# Patient Record
Sex: FEMALE | Race: BLACK OR AFRICAN AMERICAN | Employment: FULL TIME | URBAN - METROPOLITAN AREA
[De-identification: names, ages, dates, MRNs, and addresses within clinical notes are randomized per-mention and may not be internally consistent; named-entity substitution may affect disease eponyms.]

---

## 2018-03-16 ENCOUNTER — HOSPITAL ENCOUNTER (OUTPATIENT)
Dept: MAMMOGRAPHY | Age: 38
Discharge: HOME | End: 2018-03-16
Attending: FAMILY MEDICINE
Payer: COMMERCIAL

## 2018-03-16 ENCOUNTER — HOSPITAL ENCOUNTER (OUTPATIENT)
Dept: ULTRASOUND IMAGING | Age: 38
Discharge: HOME | End: 2018-03-16
Attending: FAMILY MEDICINE
Payer: COMMERCIAL

## 2018-03-16 DIAGNOSIS — N64.4 MASTODYNIA: ICD-10-CM

## 2018-03-16 DIAGNOSIS — N64.4 BREAST PAIN: ICD-10-CM

## 2018-03-16 LAB — MAMMOGRAPHY, EXTERNAL: NORMAL

## 2018-03-16 PROCEDURE — 76642 ULTRASOUND BREAST LIMITED: CPT

## 2018-03-16 PROCEDURE — 77062 BREAST TOMOSYNTHESIS BI: CPT

## 2020-11-29 ENCOUNTER — HOSPITAL ENCOUNTER (EMERGENCY)
Age: 40
Discharge: HOME | End: 2020-11-29
Attending: FAMILY MEDICINE
Payer: COMMERCIAL

## 2020-11-29 VITALS
RESPIRATION RATE: 16 BRPM | TEMPERATURE: 98.4 F | HEART RATE: 79 BPM | SYSTOLIC BLOOD PRESSURE: 150 MMHG | BODY MASS INDEX: 23.32 KG/M2 | DIASTOLIC BLOOD PRESSURE: 97 MMHG | OXYGEN SATURATION: 100 % | HEIGHT: 65 IN | WEIGHT: 140 LBS

## 2020-11-29 DIAGNOSIS — L50.1 IDIOPATHIC URTICARIA: Primary | ICD-10-CM

## 2020-11-29 PROCEDURE — 74011250636 HC RX REV CODE- 250/636: Performed by: FAMILY MEDICINE

## 2020-11-29 PROCEDURE — 96375 TX/PRO/DX INJ NEW DRUG ADDON: CPT

## 2020-11-29 PROCEDURE — 99284 EMERGENCY DEPT VISIT MOD MDM: CPT

## 2020-11-29 PROCEDURE — 96374 THER/PROPH/DIAG INJ IV PUSH: CPT

## 2020-11-29 RX ORDER — FAMOTIDINE 10 MG/ML
20 INJECTION INTRAVENOUS EVERY 12 HOURS
Status: DISCONTINUED | OUTPATIENT
Start: 2020-11-29 | End: 2020-11-29 | Stop reason: HOSPADM

## 2020-11-29 RX ORDER — DIPHENHYDRAMINE HYDROCHLORIDE 50 MG/ML
50 INJECTION, SOLUTION INTRAMUSCULAR; INTRAVENOUS
Status: COMPLETED | OUTPATIENT
Start: 2020-11-29 | End: 2020-11-29

## 2020-11-29 RX ORDER — METHYLPREDNISOLONE 4 MG/1
TABLET ORAL
Qty: 1 DOSE PACK | Refills: 0 | Status: SHIPPED | OUTPATIENT
Start: 2020-11-29 | End: 2021-04-14

## 2020-11-29 RX ADMIN — DIPHENHYDRAMINE HYDROCHLORIDE 50 MG: 50 INJECTION, SOLUTION INTRAMUSCULAR; INTRAVENOUS at 14:12

## 2020-11-29 RX ADMIN — METHYLPREDNISOLONE SODIUM SUCCINATE 125 MG: 125 INJECTION, POWDER, FOR SOLUTION INTRAMUSCULAR; INTRAVENOUS at 14:08

## 2020-11-29 RX ADMIN — FAMOTIDINE 20 MG: 10 INJECTION, SOLUTION INTRAVENOUS at 14:10

## 2020-11-29 NOTE — DISCHARGE INSTRUCTIONS
Patient Education        Hives: Care Instructions  Your Care Instructions  Hives are raised, red, itchy patches of skin. They are also called wheals or welts. They usually have red borders and pale centers. Hives range in size from ¼ inch to 3 inches or more across. They may seem to move from place to place on the skin. Several hives may form a large area of raised, red skin. You can get hives after an insect sting, after taking medicine or eating certain foods, or because of infection or stress. Other causes include plants, things you breathe in, makeup, heat, cold, sunlight, and latex. You cannot spread hives to other people. Hives may last a few minutes or a few days, but a single spot may last less than 36 hours. Follow-up care is a key part of your treatment and safety. Be sure to make and go to all appointments, and call your doctor if you are having problems. It's also a good idea to know your test results and keep a list of the medicines you take. How can you care for yourself at home? · Avoid whatever you think may have caused your hives, such as a certain food or medicine. However, you may not know the cause. · Put a cool, wet towel on the area to relieve itching. · Take an over-the-counter antihistamine, such as diphenhydramine (Benadryl), cetirizine (Zyrtec), or loratadine (Claritin), to help stop the hives and calm the itching. Read and follow directions on the label. These medicines can make you feel sleepy. Do not drive while using them. · Stay away from strong soaps, detergents, and chemicals. These can make itching worse. When should you call for help? Call 911 anytime you think you may need emergency care. For example, call if:    · You have symptoms of a severe allergic reaction. These may include:  ? Sudden raised, red areas (hives) all over your body. ? Swelling of the throat, mouth, lips, or tongue. ? Trouble breathing. ? Passing out (losing consciousness).  Or you may feel very lightheaded or suddenly feel weak, confused, or restless. Call your doctor now or seek immediate medical care if:    · You have symptoms of an allergic reaction, such as:  ? A rash or hives (raised, red areas on the skin). ? Itching. ? Swelling. ? Belly pain, nausea, or vomiting.     · You get hives after you start a new medicine.     · Hives have not gone away after 24 hours. Watch closely for changes in your health, and be sure to contact your doctor if:    · You do not get better as expected. Where can you learn more? Go to http://www.gray.com/  Enter [id number] in the search box to learn more about \"Hives: Care Instructions. \"  Current as of: June 26, 2019               Content Version: 12.6  © 6899-7310 [employer]. Care instructions adapted under license by [employer] (which disclaims liability or warranty for this information). If you have questions about a medical condition or this instruction, always ask your healthcare professional. [address] any warranty or liability for your use of this information.

## 2020-11-29 NOTE — ED PROVIDER NOTES
EMERGENCY DEPARTMENT HISTORY AND PHYSICAL EXAM      Date: 11/29/2020  Patient Name: Jossy Paz    History of Presenting Illness     Chief Complaint   Patient presents with    Allergic Reaction       History Provided By: Patient    HPI: Jossy Paz, 47 y.o. female presents to the ED with complaints of a possible allergic reaction. Pt reports sxs beginning with a rash on the right hand 2 days ago. She attempted to treat the rash with Benadryl, with minimal relief. She woke up this morning with swelling of her lower lip and under bilateral eyes, prompting her visit to the ED. Pt did not take any Benadryl today. She denies SOB, difficulty swallowing, wheezing, fever, chills, or N/V. NKDA. Pt denies any recent use of new soaps, lotions, laundry detergents or foods. She does not take any medications for blood pressure. There are no other complaints, changes, or physical findings at this time. PCP: Trey Erazo MD    No current facility-administered medications on file prior to encounter. Current Outpatient Medications on File Prior to Encounter   Medication Sig Dispense Refill    [DISCONTINUED] PRENATAL VIT/FE FUMARATE/FA (PRENATAL PO) Take  by mouth. Past History     Past Medical History:  History reviewed. No pertinent past medical history. Past Surgical History:  History reviewed. No pertinent surgical history. Family History:  Family History   Problem Relation Age of Onset   [address] Hypertension Mother     Hypertension Father        Social History:  Social History     Tobacco Use    Smoking status: Never Smoker    Smokeless tobacco: Never Used   Substance Use Topics    Alcohol use: No    Drug use: No       Allergies:  No Known Allergies      Review of Systems   Review of Systems   Constitutional: Negative for chills and fever. HENT: Positive for facial swelling (lower lip). Negative for trouble swallowing. Eyes:        Positive for swelling under bilateral eyes. Respiratory: Negative for shortness of breath and wheezing. Gastrointestinal: Negative for nausea and vomiting. Skin: Positive for rash (right hand). Physical Exam   Physical Exam  Vitals signs and nursing note reviewed. Constitutional:       General: She is awake. She is not in acute distress. Appearance: She is well-developed. She is not diaphoretic. HENT:      Head: Normocephalic and atraumatic. Right Ear: External ear normal.      Left Ear: External ear normal.      Mouth/Throat:      Mouth: Mucous membranes are moist.      Pharynx: Oropharynx is clear. Comments: There is swelling of the lower lip. No tongue swelling. Eyes:      General: No scleral icterus. Conjunctiva/sclera: Conjunctivae normal.      Pupils: Pupils are equal, round, and reactive to light. Comments: There is puffiness under bilateral eyes. Neck:      Musculoskeletal: Normal range of motion and neck supple. Thyroid: No thyromegaly. Vascular: No JVD. Trachea: No tracheal deviation. Cardiovascular:      Rate and Rhythm: Normal rate and regular rhythm. Heart sounds: Normal heart sounds. No murmur. No friction rub. No gallop. Pulmonary:      Effort: Pulmonary effort is normal. No respiratory distress. Breath sounds: Normal breath sounds. No stridor. No wheezing, rhonchi or rales. Musculoskeletal: Normal range of motion. General: No tenderness. Lymphadenopathy:      Cervical: No cervical adenopathy. Skin:     General: Skin is warm and dry. Findings: No erythema or rash. Neurological:      Mental Status: She is alert and oriented to person, place, and time. Cranial Nerves: No cranial nerve deficit. Psychiatric:         Behavior: Behavior normal.         Thought Content:  Thought content normal.         Judgment: Judgment normal.         Diagnostic Study Results     Labs -   No results found for this or any previous visit (from the past 12 hour(s)). Radiologic Studies -   No orders to display     CT Results  (Last 48 hours)    None        CXR Results  (Last 48 hours)    None            Medical Decision Making   I am the first provider for this patient. I reviewed the vital signs, available nursing notes, past medical history, past surgical history, family history and social history. Vital Signs-Reviewed the patient's vital signs. Patient Vitals for the past 12 hrs:   Temp Pulse Resp BP SpO2   11/29/20 1416  80 16 (!) 146/97 100 %   11/29/20 1332 98.4 °F (36.9 °C) 89 16 (!) 166/105 100 %       Records Reviewed: Nursing Notes    ED Course:   1338  Initial assessment performed. The patients presenting problems have been discussed, and they are in agreement with the care plan formulated and outlined with them. I have encouraged them to ask questions as they arise throughout their visit. Provider Notes (Medical Decision Making):     2763  Presents with recurrent drug reaction. She does not know what she inhaled or ingested that may have caused this. To have a minor viral illness earlier in the week last week. She was given some steroids and Benadryl and Pepcid today. Her symptoms have slightly improved. Her home with a prescription for a Medrol Dosepak. If they do not resolve she may need to see an allergist.        Consultations:     Consultations: - NONE    Disposition     Disposition: DC- Adult Discharges: All of the diagnostic tests were reviewed and questions answered. Diagnosis, care plan and treatment options were discussed. The patient understands the instructions and will follow up as directed. The patients results have been reviewed with them. They have been counseled regarding their diagnosis. The patient verbally convey understanding and agreement of the signs, symptoms, diagnosis, treatment and prognosis and additionally agrees to follow up as recommended with their PCP in 24 - 48 hours.   They also agree with the care-plan and convey that all of their questions have been answered. I have also put together some discharge instructions for them that include: 1) educational information regarding their diagnosis, 2) how to care for their diagnosis at home, as well a 3) list of reasons why they would want to return to the ED prior to their follow-up appointment, should their condition change. Kathryn Melendez's  results have been reviewed with her. She has been counseled regarding her diagnosis, treatment, and plan. She verbally conveys understanding and agreement of the signs, symptoms, diagnosis, treatment and prognosis and additionally agrees to follow up as discussed. She also agrees with the care-plan and conveys that all of her questions have been answered. I have also provided discharge instructions for her that include: educational information regarding their diagnosis and treatment, and list of reasons why they would want to return to the ED prior to their follow-up appointment, should her condition change. Labs Reviewed - No data to display     No results found for this or any previous visit (from the past 12 hour(s)). No orders to display        CLINICAL IMPRESSION    1. Idiopathic urticaria        PLAN:  1. Current Discharge Medication List      START taking these medications    Details   methylPREDNISolone (Medrol, Kamlesh,) 4 mg tablet Take as directed. Qty: 1 Dose Pack, Refills: 0           2. Follow-up Information    None       Return to ED if worse     Diagnosis     Clinical Impression:   1. Idiopathic urticaria        By signing my name below, I, Ismael Mccarty, ED scribe, attest that this documentation has been prepared under the direction and in presence of Damir Castro MD on 11/29/20.  Electronically signed: Ismael Mccarty, 11/29/20, 1:54 PM

## 2020-11-29 NOTE — ED TRIAGE NOTES
Pt states she started out with itching a few bumps 2 days ago, states she woke up this am with swollen eyes and her lips started to swell. Pt has been taking benadryl ( although she has not had any today), but the swelling and itching is getting worse. Pt denies any new products/foods or meds.    No hx of taking Lisinopril

## 2020-11-29 NOTE — Clinical Note
[facility] EMERGENCY DEPT 
[facility] [id number] 
[phone] Work/School Note Date: 11/29/2020 To Whom It May concern: 
 
Milagros Augustine was seen and treated today in the emergency room by the following provider(s): 
Attending Provider: Jhony Miller MD. Milagros Augustine is excused from work/school on 11/29/20 and 11/30/20. She is medically clear to return to work/school on 12/1/2020. Sincerely, Rhianna Ansari MD

## 2020-12-04 ENCOUNTER — OFFICE VISIT (OUTPATIENT)
Dept: FAMILY MEDICINE CLINIC | Age: 40
End: 2020-12-04
Payer: COMMERCIAL

## 2020-12-04 VITALS
HEART RATE: 87 BPM | BODY MASS INDEX: 26.82 KG/M2 | SYSTOLIC BLOOD PRESSURE: 150 MMHG | OXYGEN SATURATION: 100 % | HEIGHT: 65 IN | WEIGHT: 161 LBS | DIASTOLIC BLOOD PRESSURE: 109 MMHG | TEMPERATURE: 97.8 F

## 2020-12-04 DIAGNOSIS — I10 ESSENTIAL HYPERTENSION: Primary | ICD-10-CM

## 2020-12-04 DIAGNOSIS — L50.9 URTICARIA: ICD-10-CM

## 2020-12-04 PROCEDURE — 99213 OFFICE O/P EST LOW 20 MIN: CPT | Performed by: FAMILY MEDICINE

## 2020-12-04 RX ORDER — LOSARTAN POTASSIUM 50 MG/1
50 TABLET ORAL DAILY
Qty: 30 TAB | Refills: 1 | Status: SHIPPED | OUTPATIENT
Start: 2020-12-04 | End: 2021-04-14

## 2020-12-04 NOTE — PROGRESS NOTES
Ewa Hagen presents today for No chief complaint on file. Is someone accompanying this pt? no    Is the patient using any DME equipment during [address]? no    Depression Screening:  3 most recent PHQ Screens 12/4/2020   Little interest or pleasure in doing things Not at all   Feeling down, depressed, irritable, or hopeless Not at all   Total Score PHQ 2 0       Learning Assessment:  Learning Assessment 12/4/2020   PRIMARY LEARNER Patient   PRIMARY LANGUAGE ENGLISH   LEARNER PREFERENCE PRIMARY DEMONSTRATION   ANSWERED BY patient   RELATIONSHIP SELF       Health Maintenance reviewed and discussed and ordered per Provider. Health Maintenance Due   Topic Date Due    DTaP/Tdap/Td series (1 - Tdap) 01/28/2003    PAP AKA CERVICAL CYTOLOGY  01/28/2003    Flu Vaccine (1) 09/01/2020   . Coordination of Care:  1. Have you been to the ER, urgent care clinic since your last visit? Hospitalized since your last visit? no    2. Have you seen or consulted any other health care providers outside of the [address] [place] [address] since your last visit? Include any pap smears or colon screening.  no    Providers orders his own labs, orders for colonoscopy, mammograms and referrals as needed    Last UDS Checked no  Last Pain contract signed: no

## 2020-12-04 NOTE — PROGRESS NOTES
Subjective:   Ayanna Willard is a 47 y.o. female who was seen for No chief complaint on file. HPI patient is a 28-year-old -American female who is seen because she went to the emergency room with urticaria and was found to have an elevated blood pressure. She was given steroids her urticaria is improving she has no idea why she had that. She had no falls or injuries no rash no syncope or loss of consciousness. Bowel movements have been appropriate. Nobody at home has been sick she has never had urticaria before she has a significant family history of hypertension. She is not on any medication. She has used some new dryer sheets but that is all she can think of she did try their Thanksgiving turkey and peanut oil. Home Medications    Medication Sig Start Date End Date Taking? Authorizing Provider   methylPREDNISolone (Medrol, Kamlesh,) 4 mg tablet Take as directed. 11/29/20   Radha Augustine MD      No Known Allergies  Social History     Tobacco Use    Smoking status: Never Smoker    Smokeless tobacco: Never Used   Substance Use Topics    Alcohol use: No    Drug use: No        Patient Active Problem List   Diagnosis Code    Essential hypertension I10    Urticaria L50.9       Review of Systems   Constitutional: Negative. HENT: Negative. Eyes: Negative. Respiratory: Negative. Cardiovascular: Negative. Gastrointestinal: Negative. Endocrine: Negative. Genitourinary: Negative. Musculoskeletal: Negative. Allergic/Immunologic:        Urticaria   Neurological: Negative. Hematological: Negative. Psychiatric/Behavioral: Negative.          Physical Exam   Objective:     Visit Vitals  BP (!) 150/109 (BP 1 Location: Left arm, BP Patient Position: Sitting)   Pulse 87   Temp 97.8 °F (36.6 °C)   Ht 5' 5\" (1.651 m)   Wt 161 lb (73 kg)   SpO2 100%   BMI 26.79 kg/m²      General: alert, cooperative, no distress   Mental  status: normal mood, behavior, speech, dress, motor activity, and thought processes, able to follow commands   HENT: NCAT   Neck: no visualized mass   Resp: no respiratory distress   Neuro: no gross deficits   Skin: no discoloration or lesions of concern on visible areas   Psychiatric: normal affect, consistent with stated mood, no evidence of hallucinations   Lungs are clear the cardiovascular exam showed a regular rate and rhythm the abdomen is soft extremities are clear she has no edema she is pleasant and cooperative I do not hear any murmurs gallops or rubs. Her gait is normal.  No edema. Assessment & Plan:     Hypertension newly diagnosed, urticaria: The urticaria is improved significantly. I am starting her on losartan 50 mg a day I have asked her to get a blood pressure cuff I want to check her pressure twice a day and will reevaluate in 3 weeks or sooner if needed I have asked her to limit her salt to continue her physical activity and she will call us if any other issues arise we may need a further cardiac evaluation      I spent at least 23 minutes on this visit with this established patient. [phone]    Additional exam findings: We discussed the expected course, resolution and complications of the diagnosis(es) in detail. Medication risks, benefits, costs, interactions, and alternatives were discussed as indicated. I advised her to contact the office if her condition worsens, changes or fails to improve as anticipated. She expressed understanding with the diagnosis(es) and plan.

## 2020-12-22 VITALS
DIASTOLIC BLOOD PRESSURE: 88 MMHG | HEART RATE: 82 BPM | RESPIRATION RATE: 20 BRPM | WEIGHT: 143 LBS | OXYGEN SATURATION: 100 % | TEMPERATURE: 97.7 F | HEIGHT: 66 IN | BODY MASS INDEX: 22.98 KG/M2 | SYSTOLIC BLOOD PRESSURE: 132 MMHG

## 2020-12-22 RX ORDER — ETONOGESTREL 68 MG/1
IMPLANT SUBCUTANEOUS
COMMUNITY

## 2021-04-14 ENCOUNTER — OFFICE VISIT (OUTPATIENT)
Dept: FAMILY MEDICINE CLINIC | Age: 41
End: 2021-04-14
Payer: COMMERCIAL

## 2021-04-14 VITALS
TEMPERATURE: 96.9 F | DIASTOLIC BLOOD PRESSURE: 86 MMHG | OXYGEN SATURATION: 100 % | WEIGHT: 166 LBS | HEART RATE: 92 BPM | BODY MASS INDEX: 27.62 KG/M2 | SYSTOLIC BLOOD PRESSURE: 136 MMHG

## 2021-04-14 DIAGNOSIS — I10 ESSENTIAL HYPERTENSION: Primary | ICD-10-CM

## 2021-04-14 DIAGNOSIS — F43.20 ADJUSTMENT DISORDER, UNSPECIFIED TYPE: ICD-10-CM

## 2021-04-14 PROCEDURE — 99213 OFFICE O/P EST LOW 20 MIN: CPT | Performed by: FAMILY MEDICINE

## 2021-04-14 RX ORDER — LOSARTAN POTASSIUM 100 MG/1
100 TABLET ORAL DAILY
Qty: 30 TAB | Refills: 1 | Status: SHIPPED | OUTPATIENT
Start: 2021-04-14 | End: 2022-03-28 | Stop reason: SDUPTHER

## 2021-04-14 NOTE — PROGRESS NOTES
Subjective:   Brianna Chery is a 46 y.o. female who was seen for Follow-up (BP) and Hypertension    HPI this is the second note I dictated on this patient I do not know where in the computer the other note is gone. She is seen for follow-up of her blood pressure she and her boyfriend are ending their relationship of 11 years she is on Cozaar 50 mg she has had no fever or chills she has had no chest pain or shortness of breath has been eating relatively well nobody at home has been sick no falls or injuries. She denies being suicidal or homicidal.  No other rashes. Home Medications    Medication Sig Start Date End Date Taking? Authorizing Provider   losartan (COZAAR) 100 mg tablet Take 1 Tab by mouth daily. 4/14/21  Yes Jeremiah Noriega MD   etonogestreL (Nexplanon) 68 mg impl by SubDERmal route. Yes Provider, Historical      No Known Allergies  Social History     Tobacco Use    Smoking status: Never Smoker    Smokeless tobacco: Never Used   Substance Use Topics    Alcohol use: No    Drug use: No            Review of Systems   Constitutional: Negative. HENT: Negative. Eyes: Negative. Respiratory: Negative. Cardiovascular: Negative. Gastrointestinal: Negative. Endocrine: Negative. Genitourinary: Negative. Musculoskeletal: Negative. Hematological: Negative. Psychiatric/Behavioral: Negative.          Physical Exam   Objective:     Visit Vitals  /86   Pulse 92   Temp 96.9 °F (36.1 °C)   Wt 166 lb (75.3 kg)   SpO2 100%   BMI 27.62 kg/m²      General: alert, cooperative, no distress   Mental  status: normal mood, behavior, speech, dress, motor activity, and thought processes, able to follow commands   HENT: NCAT   Neck: no visualized mass   Resp: no respiratory distress   Neuro: no gross deficits   Skin: no discoloration or lesions of concern on visible areas   Psychiatric: normal affect, consistent with stated mood, no evidence of hallucinations     He is a little anxious today she is not clinically depressed we will going to send her medicine increase in today. I have allowed her to ventilate today. Assessment & Plan:     Tension, adjustment disorder: I have allowed her to ventilate regarding her relationship. I am going to increase her Cozaar to 100 mg. We will get her back in 3 weeks I have asked her to bring her blood pressure cuff with us we will follow-up if any other issues arise. Was a 25-minute visit greater than half the time was spent in consultation        35 20 43    Additional exam findings: We discussed the expected course, resolution and complications of the diagnosis(es) in detail. Medication risks, benefits, costs, interactions, and alternatives were discussed as indicated. I advised her to contact the office if her condition worsens, changes or fails to improve as anticipated. She expressed understanding with the diagnosis(es) and plan.

## 2021-04-14 NOTE — PROGRESS NOTES
Subjective:   Keira Gasca is a 46 y.o. female who was seen for Follow-up (BP) and Hypertension    HPI the patient is a 82-year-old female who was started on Cozaar 50 mg several weeks ago she and her boyfriend of 11 years are ending the relationship she has been under a lot of stress sometimes at home her blood, reads 150/ 5/98 she is concerned about her blood pressure but does admit she is under a lot of stress in this relationship which is going to end. She is not suicidal or homicidal.  She has no rash no syncope or loss of consciousness. Bowel movements have been appropriate. No chest pain or shortness of breath has been eating really well well. Home Medications    Medication Sig Start Date End Date Taking? Authorizing Provider   losartan (COZAAR) 100 mg tablet Take 1 Tab by mouth daily. 4/14/21  Yes Atiya Tellez MD   etonogestreL (Nexplanon) 68 mg impl by SubDERmal route. Yes Provider, Historical      No Known Allergies  Social History     Tobacco Use    Smoking status: Never Smoker    Smokeless tobacco: Never Used   Substance Use Topics    Alcohol use: No    Drug use: No            Review of Systems   Constitutional: Negative. HENT: Negative. Eyes: Negative. Respiratory: Negative. Cardiovascular: Negative. Gastrointestinal: Negative. Genitourinary: Negative. Musculoskeletal: Negative. Neurological: Negative. Hematological: Negative. Psychiatric/Behavioral: Negative.          Physical Exam   Objective:     Visit Vitals  /86   Pulse 92   Temp 96.9 °F (36.1 °C)   Wt 166 lb (75.3 kg)   SpO2 100%   BMI 27.62 kg/m²      General: alert, cooperative, no distress   Mental  status: normal mood, behavior, speech, dress, motor activity, and thought processes, able to follow commands   HENT: NCAT   Neck: no visualized mass   Resp: no respiratory distress   Neuro: no gross deficits   Skin: no discoloration or lesions of concern on visible areas Psychiatric: normal affect, consistent with stated mood, no evidence of hallucinations   Blood pressure is normal today. The lungs are clear the cardiovascular exam showed a regular rate and rhythm. The abdomen is benign the extremities are clear no thyromegaly no lymphadenopathy no murmurs gallops or rubs. Oriented x3. Not clinically anxious or depressed. No rashes are seen. Assessment & Plan:     Tension, adjustment disorder: I have allowed the patient to evaluate her relationship with his boyfriend. It looks like it can and she does not seem any medications are needed she does not need any counseling at this point. She has friends around her I am going to increase her Cozaar to 100 mg a day I have asked her to recheck it with us in 3 weeks and to bring her blood pressure cuff with us we can reevaluate it at that time. I have increased her medicine today I have allowed her to ventilate. It was a positive day for the patient        712    Additional exam findings: We discussed the expected course, resolution and complications of the diagnosis(es) in detail. Medication risks, benefits, costs, interactions, and alternatives were discussed as indicated. I advised her to contact the office if her condition worsens, changes or fails to improve as anticipated. She expressed understanding with the diagnosis(es) and plan.    Eddie Landis presents today for   Chief Complaint   Patient presents with    Follow-up     BP    Hypertension       Is someone accompanying this pt? no    Is the patient using any DME equipment during OV? no    Depression Screening:  3 most recent PHQ Screens 4/14/2021   Little interest or pleasure in doing things Not at all   Feeling down, depressed, irritable, or hopeless Not at all   Total Score PHQ 2 0       Learning Assessment:  Learning Assessment 12/4/2020   PRIMARY LEARNER Patient   PRIMARY LANGUAGE ENGLISH   LEARNER PREFERENCE PRIMARY DEMONSTRATION   ANSWERED BY patient   RELATIONSHIP SELF       Fall Risk  No flowsheet data found. ADL  ADL Assessment 4/14/2021   Feeding yourself No Help Needed   Getting from bed to chair No Help Needed   Getting dressed No Help Needed   Bathing or showering No Help Needed   Walk across the room (includes cane/walker) No Help Needed   Using the telphone No Help Needed   Taking your medications No Help Needed   Preparing meals No Help Needed   Managing money (expenses/bills) No Help Needed   Moderately strenuous housework (laundry) No Help Needed   Shopping for personal items (toiletries/medicines) No Help Needed   Shopping for groceries No Help Needed   Driving No Help Needed   Climbing a flight of stairs No Help Needed   Getting to places beyond walking distances No Help Needed       Travel Screening:    Travel Screening     Question   Response    In the last month, have you been in contact with someone who was confirmed or suspected to have Coronavirus / COVID-19? No / Unsure    Have you had a COVID-19 viral test in the last 14 days? No    Do you have any of the following new or worsening symptoms? None of these    Have you traveled internationally or domestically in the last month? No      Travel History   Travel since 03/14/21     No documented travel since 03/14/21          Health Maintenance reviewed and discussed and ordered per Provider. Health Maintenance Due   Topic Date Due    Hepatitis C Screening  Never done    DTaP/Tdap/Td series (1 - Tdap) Never done    PAP AKA CERVICAL CYTOLOGY  Never done   . Coordination of Care:  1. Have you been to the ER, urgent care clinic since your last visit? Hospitalized since your last visit? no    2. Have you seen or consulted any other health care providers outside of the [facility] [place] [address] [place] since your last visit? Include any pap smears or colon screening.  no

## 2022-03-18 PROBLEM — L50.9 URTICARIA: Status: ACTIVE | Noted: 2020-12-04

## 2022-03-20 PROBLEM — I10 ESSENTIAL HYPERTENSION: Status: ACTIVE | Noted: 2020-12-04

## 2022-03-25 DIAGNOSIS — I10 ESSENTIAL HYPERTENSION: ICD-10-CM

## 2022-03-25 NOTE — TELEPHONE ENCOUNTER
Patient has not been seen since 4/21 by Dr. Anabella Johnson. Patient must setup an appt prior to any refills.  joão Eaton

## 2022-03-25 NOTE — TELEPHONE ENCOUNTER
----- Message from Erin Newman sent at 3/25/2022  9:40 AM EDT -----  Subject: Refill Request    QUESTIONS  Name of Medication? losartan (COZAAR) 100 mg tablet  Patient-reported dosage and instructions? 100mg/2x day  How many days do you have left? 3  Preferred Pharmacy? [address] phone number (if available)? [phone]  ---------------------------------------------------------------------------  --------------  CALL BACK INFO  What is the best way for the office to contact you? OK to leave message on   voicemail  Preferred Call Back Phone Number?  [phone]

## 2022-03-28 NOTE — TELEPHONE ENCOUNTER
Patient called stated she is out of these meds, [employer]. Rx Losartan    Requested Prescriptions     Pending Prescriptions Disp Refills    losartan (COZAAR) 100 mg tablet 30 Tablet 1     Sig: Take 1 Tablet by mouth daily.  etonogestreL (Nexplanon) 68 mg impl       Sig: by SubDERmal route.

## 2022-03-29 RX ORDER — ETONOGESTREL 68 MG/1
IMPLANT SUBCUTANEOUS
OUTPATIENT
Start: 2022-03-29

## 2022-03-29 RX ORDER — LOSARTAN POTASSIUM 100 MG/1
100 TABLET ORAL DAILY
Qty: 30 TABLET | Refills: 1 | Status: SHIPPED | OUTPATIENT
Start: 2022-03-29 | End: 2022-03-30 | Stop reason: SDUPTHER

## 2022-03-30 ENCOUNTER — OFFICE VISIT (OUTPATIENT)
Dept: FAMILY MEDICINE CLINIC | Age: 42
End: 2022-03-30
Payer: COMMERCIAL

## 2022-03-30 ENCOUNTER — HOSPITAL ENCOUNTER (OUTPATIENT)
Dept: LAB | Age: 42
Discharge: HOME | End: 2022-03-30
Payer: COMMERCIAL

## 2022-03-30 VITALS
WEIGHT: 175.6 LBS | HEART RATE: 87 BPM | DIASTOLIC BLOOD PRESSURE: 103 MMHG | SYSTOLIC BLOOD PRESSURE: 154 MMHG | HEIGHT: 66 IN | RESPIRATION RATE: 16 BRPM | BODY MASS INDEX: 28.22 KG/M2 | OXYGEN SATURATION: 100 % | TEMPERATURE: 98.8 F

## 2022-03-30 DIAGNOSIS — Z13.1 DIABETES MELLITUS SCREENING: ICD-10-CM

## 2022-03-30 DIAGNOSIS — Z11.59 NEED FOR HEPATITIS C SCREENING TEST: ICD-10-CM

## 2022-03-30 DIAGNOSIS — I10 ESSENTIAL HYPERTENSION: ICD-10-CM

## 2022-03-30 DIAGNOSIS — R68.89 OTHER GENERAL SYMPTOMS AND SIGNS: ICD-10-CM

## 2022-03-30 DIAGNOSIS — Z13.220 SCREENING CHOLESTEROL LEVEL: ICD-10-CM

## 2022-03-30 DIAGNOSIS — R76.8 POSITIVE ANA (ANTINUCLEAR ANTIBODY): Primary | ICD-10-CM

## 2022-03-30 DIAGNOSIS — E55.9 VITAMIN D DEFICIENCY: ICD-10-CM

## 2022-03-30 DIAGNOSIS — R76.8 POSITIVE ANA (ANTINUCLEAR ANTIBODY): ICD-10-CM

## 2022-03-30 LAB
ALBUMIN SERPL-MCNC: 4.1 G/DL (ref 3.5–4.7)
ALBUMIN/GLOB SERPL: 1.2 {RATIO}
ALP SERPL-CCNC: 69 U/L (ref 38–126)
ALT SERPL-CCNC: 18 U/L (ref 3–52)
ANION GAP SERPL CALC-SCNC: 7 MMOL/L
AST SERPL W P-5'-P-CCNC: 20 U/L (ref 14–74)
BASOPHILS # BLD: 0 K/UL (ref 0–0.1)
BASOPHILS NFR BLD: 1 % (ref 0–2)
BILIRUB SERPL-MCNC: 0.2 MG/DL (ref 0.2–1)
BUN SERPL-MCNC: 10 MG/DL (ref 9–21)
BUN/CREAT SERPL: 17
CA-I BLD-MCNC: 9.3 MG/DL (ref 8.5–10.5)
CHLORIDE SERPL-SCNC: 104 MMOL/L (ref 94–111)
CO2 SERPL-SCNC: 25 MMOL/L (ref 21–33)
CREAT SERPL-MCNC: 0.6 MG/DL (ref 0.7–1.2)
DIFFERENTIAL METHOD BLD: ABNORMAL
EOSINOPHIL # BLD: 0.1 K/UL (ref 0–0.4)
EOSINOPHIL NFR BLD: 4 % (ref 0–5)
ERYTHROCYTE [DISTWIDTH] IN BLOOD BY AUTOMATED COUNT: 12.1 % (ref 11.6–14.5)
GLOBULIN SER CALC-MCNC: 3.5 G/DL
GLUCOSE SERPL-MCNC: 93 MG/DL (ref 70–110)
HCT VFR BLD AUTO: 35.6 % (ref 35–45)
HGB BLD-MCNC: 12.2 G/DL (ref 12–16)
IMM GRANULOCYTES # BLD AUTO: 0 K/UL (ref 0–0.04)
IMM GRANULOCYTES NFR BLD AUTO: 0 % (ref 0–0.5)
LYMPHOCYTES # BLD: 1.6 K/UL (ref 0.9–3.6)
LYMPHOCYTES NFR BLD: 41 % (ref 21–52)
MCH RBC QN AUTO: 31.8 PG (ref 24–34)
MCHC RBC AUTO-ENTMCNC: 34.3 G/DL (ref 31–37)
MCV RBC AUTO: 92.7 FL (ref 78–100)
MONOCYTES # BLD: 0.4 K/UL (ref 0.05–1.2)
MONOCYTES NFR BLD: 10 % (ref 3–10)
NEUTS SEG # BLD: 1.8 K/UL (ref 1.8–8)
NEUTS SEG NFR BLD: 44 % (ref 40–73)
NRBC # BLD: 0 K/UL (ref 0–0.01)
NRBC BLD-RTO: 0 PER 100 WBC
PLATELET # BLD AUTO: 254 K/UL (ref 135–420)
PMV BLD AUTO: 10.3 FL (ref 9.2–11.8)
POTASSIUM SERPL-SCNC: 3.8 MMOL/L (ref 3.2–5.1)
PROT SERPL-MCNC: 7.6 G/DL (ref 6.1–8.4)
RBC # BLD AUTO: 3.84 M/UL (ref 4.2–5.3)
SODIUM SERPL-SCNC: 136 MMOL/L (ref 135–145)
WBC # BLD AUTO: 3.9 K/UL (ref 4.6–13.2)

## 2022-03-30 PROCEDURE — 80053 COMPREHEN METABOLIC PANEL: CPT

## 2022-03-30 PROCEDURE — 99214 OFFICE O/P EST MOD 30 MIN: CPT | Performed by: NURSE PRACTITIONER

## 2022-03-30 PROCEDURE — 86225 DNA ANTIBODY NATIVE: CPT

## 2022-03-30 PROCEDURE — 36415 COLL VENOUS BLD VENIPUNCTURE: CPT

## 2022-03-30 PROCEDURE — 85025 COMPLETE CBC W/AUTO DIFF WBC: CPT

## 2022-03-30 RX ORDER — LOSARTAN POTASSIUM 100 MG/1
100 TABLET ORAL DAILY
Qty: 30 TABLET | Refills: 1 | Status: SHIPPED | OUTPATIENT
Start: 2022-03-30 | End: 2022-05-24 | Stop reason: SDUPTHER

## 2022-03-30 NOTE — LETTER
NOTIFICATION RETURN TO WORK    3/30/2022 9:57 AM    Ms. Lincoln Tyler  [address] [facility] [address]      To Whom It May Concern:    Lincoln Tyler is currently under the care of [address] [place] [address]. She will return to work on March 31, 2022    If there are questions or concerns please have the patient contact our office.         Sincerely,      Clifford Sullivan NP

## 2022-03-30 NOTE — PROGRESS NOTES
History of Present Illness  Kathryn Weber is a 38 y.o. female who presents today for:    Chief Complaint   Patient presents with   [facility] Establish Care     establish care with provider was a [facility] patient      Past Medical History  History reviewed. No pertinent past medical history. Surgical History  History reviewed. No pertinent surgical history. Current Medications  Current Outpatient Medications   Medication Sig    losartan (COZAAR) 100 mg tablet Take 1 Tablet by mouth daily.  etonogestreL (Nexplanon) 68 mg impl by SubDERmal route. No current facility-administered medications for this visit. Allergies/Drug Reactions  No Known Allergies     Family History  Family History   Problem Relation Age of Onset    Hypertension Mother     Hypertension Father         Social History  Social History     Tobacco Use    Smoking status: Never Smoker    Smokeless tobacco: Never Used   Substance Use Topics    Alcohol use: No    Drug use: No        Health Maintenance   Topic Date Due    Hepatitis C Screening  Never done    DTaP/Tdap/Td series (1 - Tdap) Never done    COVID-19 Vaccine (3 - Booster for Moderna series) 11/20/2021    Lipid Screen  Never done    Flu Vaccine (1) 06/30/2022 (Originally 9/1/2021)    Depression Screen  04/14/2022    Cervical cancer screen  04/07/2026    Pneumococcal 0-64 years  Aged [employer] History   Administered Date(s) Administered    COVID-19, Janine Copake, Primary or Immunocompromised Series, MRNA, PF, 100mcg/0.5mL 05/25/2021, 06/20/2021       Review of Systems  Review of Systems   Constitutional: Negative. HENT: Negative. Eyes: Negative. Respiratory: Negative. Cardiovascular: Negative. Gastrointestinal: Positive for heartburn. Negative for abdominal pain, blood in stool, constipation, diarrhea, melena, nausea and vomiting. Genitourinary: Negative. Musculoskeletal: Negative. Skin: Negative. Neurological: Negative. Endo/Heme/Allergies: Positive for environmental allergies. Negative for polydipsia. Does not bruise/bleed easily. Psychiatric/Behavioral: Negative for depression, hallucinations, memory loss, substance abuse and suicidal ideas. The patient is nervous/anxious and has insomnia.          Physical Exam  Vital signs:   Vitals:    03/30/22 0916 03/30/22 1007   BP: (!) 154/109 (!) 154/103   Pulse: 87    Resp: 16    Temp: 98.8 °F (37.1 °C)    SpO2: 100%    Weight: 175 lb 9.6 oz (79.7 kg)    Height: 5' 5.5\" (1.664 m)      General: alert, oriented, not in distress  Head: scalp normal, atraumatic  Eyes: pupils are equal and reactive, full and intact EOM's  Ears: patent ear canal, intact tympanic membrane  Nose: normal turbinates, no congestion or discharge  Lips/Mouth: moist lips and buccal mucosa, non-enlarged tonsils, pink throat  Neck: supple, no JVD, no lymphadenopathy, non-palpable thyroid  Chest/Lungs: clear breath sounds, no wheezing or crackles  Heart: normal rate, regular rhythm, no murmur  Abdomen: soft, non-distended, non-tender, normal bowel sounds, no organomegaly, no masses  Extremities: no focal deformities, no edema  Skin: no active skin lesions    Laboratory/Tests:  Hospital Outpatient Visit on 03/30/2022   Component Date Value Ref Range Status    WBC 03/30/2022 3.9* 4.6 - 13.2 K/uL Final    RBC 03/30/2022 3.84* 4.20 - 5.30 M/uL Final    HGB 03/30/2022 12.2  12.0 - 16.0 g/dL Final    HCT 03/30/2022 35.6  35.0 - 45.0 % Final    MCV 03/30/2022 92.7  78.0 - 100.0 FL Final    MCH 03/30/2022 31.8  24.0 - 34.0 PG Final    MCHC 03/30/2022 34.3  31.0 - 37.0 g/dL Final    RDW 03/30/2022 12.1  11.6 - 14.5 % Final    PLATELET 49/48/4913 813  135 - 420 K/uL Final    MPV 03/30/2022 10.3  9.2 - 11.8 FL Final    NRBC 03/30/2022 0.0  0.0  WBC Final    ABSOLUTE NRBC 03/30/2022 0.00  0.00 - 0.01 K/uL Final    NEUTROPHILS 03/30/2022 44  40 - 73 % Final    LYMPHOCYTES 03/30/2022 41  21 - 52 % Final    MONOCYTES 03/30/2022 10  3 - 10 % Final    EOSINOPHILS 03/30/2022 4  0 - 5 % Final    BASOPHILS 03/30/2022 1  0 - 2 % Final    IMMATURE GRANULOCYTES 03/30/2022 0  0 - 0.5 % Final    ABS. NEUTROPHILS 03/30/2022 1.8  1.8 - 8.0 K/UL Final    ABS. LYMPHOCYTES 03/30/2022 1.6  0.9 - 3.6 K/UL Final    ABS. MONOCYTES 03/30/2022 0.4  0.05 - 1.2 K/UL Final    ABS. EOSINOPHILS 03/30/2022 0.1  0.0 - 0.4 K/UL Final    ABS. BASOPHILS 03/30/2022 0.0  0.0 - 0.1 K/UL Final    ABS. IMM. GRANS. 03/30/2022 0.0  0.00 - 0.04 K/UL Final    DF 03/30/2022 AUTOMATED    Final    Sodium 03/30/2022 136  135 - 145 mmol/L Final    Potassium 03/30/2022 3.8  3.2 - 5.1 mmol/L Final    Chloride 03/30/2022 104  94 - 111 mmol/L Final    CO2 03/30/2022 25  21 - 33 mmol/L Final    Anion gap 03/30/2022 7  mmol/L Final    Glucose 03/30/2022 93  70 - 110 mg/dL Final    BUN 03/30/2022 10  9 - 21 mg/dL Final    Creatinine 03/30/2022 0.60* 0.70 - 1.20 mg/dL Final    BUN/Creatinine ratio 03/30/2022 17    Final    GFR est AA 03/30/2022 >60  ml/min/1.73m2 Final    GFR est non-AA 03/30/2022 >60  ml/min/1.73m2 Final    Comment: Estimated GFR is calculated using the IDMS-traceable Modification of Diet in Renal Disease (MDRD) Study equation, reported for both  Americans (GFRAA) and non- Americans (GFRNA), and normalized to 1.73m2 body surface area. The physician must decide which value applies to the patient. The MDRD study equation should only be used in individuals age 25 or older. It has not been validated for the following: pregnant women, patients with serious comorbid conditions, or on certain medications, or persons with extremes of body size, muscle mass, or nutritional status.  Calcium 03/30/2022 9.3  8.5 - 10.5 mg/dL Final    Bilirubin, total 03/30/2022 0.2  0.2 - 1.0 mg/dL Final    AST (SGOT) 03/30/2022 20  14 - 74 U/L Final    ALT (SGPT) 03/30/2022 18  3 - 52 U/L Final    Alk.  phosphatase 03/30/2022 69  38 - 126 U/L Final    Protein, total 03/30/2022 7.6  6.1 - 8.4 g/dL Final    Albumin 03/30/2022 4.1  3.5 - 4.7 g/dL Final    Globulin 03/30/2022 3.5  g/dL Final    A-G Ratio 03/30/2022 1.2    Final     Patient reports she experienced allergic reaction approximately 2 years ago and allergy testing was performed. During workup positive RAGINI was observed, but patient reports she did not follow up with provider. Approximately 2 years ago patient's Sed Rate and RA Factor were normal.  Will reorder RAGINI at this visit. Patient's blood pressure was elevated during this visit she reports she has been out of her prescribed Losartan for approximately 3 days. Will refill Losartan at this visit. Physical examination performed:  Bilateral ear tympanic membrane visualized during otoscopic examination revealed no abnormalities. Cardiac auscultation revealed no arrhythmias or murmurs. Bilateral lung sounds clear to auscultation in all fields. Abdomen soft and nontender, bowel sounds normoactive in all 4 quadrants. There is no observed bilateral lower extremity edema. Assessment/Plan:    1. Essential hypertension. Continue Losartan 100 mg tablet daily for management of essential hypertension. 2.  Positive RAGINI. Ordered RAGINI at this visit. Follow-up and Dispositions    · Return in about 1 month (around 4/30/2022). I have discussed the diagnosis with the patient and the intended plan as seen in the above orders. The patient has received an after-visit summary and questions were answered concerning future plans. I have discussed medication side effects and warnings with the patient as well. I have reviewed the plan of care with the patient, accepted their input and they are in agreement with the treatment goals.        Yazan Salcedo NP  March 30, 2022

## 2022-03-30 NOTE — PROGRESS NOTES
Grzegorz Yu presents today for   Chief Complaint   Patient presents with   [facility] Establish Care     establish care with provider was a [place] patient        Is someone accompanying this pt? No     Is the patient using any DME equipment during OV? No     Depression Screening:  3 most recent PHQ Screens 3/30/2022   Little interest or pleasure in doing things Several days   Feeling down, depressed, irritable, or hopeless Several days   Total Score PHQ 2 2       Learning Assessment:  Learning Assessment 12/4/2020   PRIMARY LEARNER Patient   PRIMARY LANGUAGE ENGLISH   LEARNER PREFERENCE PRIMARY DEMONSTRATION   ANSWERED BY patient   RELATIONSHIP SELF       Fall Risk  No flowsheet data found. ADL  ADL Assessment 4/14/2021   Feeding yourself No Help Needed   Getting from bed to chair No Help Needed   Getting dressed No Help Needed   Bathing or showering No Help Needed   Walk across the room (includes cane/walker) No Help Needed   Using the telphone No Help Needed   Taking your medications No Help Needed   Preparing meals No Help Needed   Managing money (expenses/bills) No Help Needed   Moderately strenuous housework (laundry) No Help Needed   Shopping for personal items (toiletries/medicines) No Help Needed   Shopping for groceries No Help Needed   Driving No Help Needed   Climbing a flight of stairs No Help Needed   Getting to places beyond walking distances No Help Needed       Travel Screening:    Travel Screening     Question   Response    In the last 10 days, have you been in contact with someone who was confirmed or suspected to have Coronavirus/COVID-19? No / Unsure    Have you had a COVID-19 viral test in the last 10 days? No    Do you have any of the following new or worsening symptoms? None of these    Have you traveled internationally or domestically in the last month?   No      Travel History   Travel since 02/28/22    No documented travel since 02/28/22         [facility] Maintenance reviewed and discussed and ordered per Provider. Health Maintenance Due   Topic Date Due    Hepatitis C Screening  Never done    COVID-19 Vaccine (1) Never done    DTaP/Tdap/Td series (1 - Tdap) Never done    Cervical cancer screen  Never done    Flu Vaccine (1) Never done    Lipid Screen  Never done   . Coordination of Care:  1. \"Have you been to the ER, urgent care clinic since your last visit? Hospitalized since your last visit? \" No    2. \"Have you seen or consulted any other health care providers outside of the [address] [place] [address] since your last visit? \" No     3. For patients aged 39-70: Has the patient had a colonoscopy? NA - based on age     If the patient is female:    4. For patients aged 41-77: Has the patient had a mammogram within the past 2 years? Yes - no Care Gap present    5. For patients aged 21-65: Has the patient had a pap smear? Yes - Care Gap present.  Rooming MA/LPN to request most recent results

## 2022-03-31 LAB
CENTROMERE B AB SER-ACNC: <0.2 AI (ref 0–0.9)
CHROMATIN AB SERPL-ACNC: <0.2 AI (ref 0–0.9)
DSDNA AB SER-ACNC: <1 IU/ML (ref 0–9)
ENA JO1 AB SER-ACNC: <0.2 AI (ref 0–0.9)
ENA RNP AB SER-ACNC: 0.2 AI (ref 0–0.9)
ENA SCL70 AB SER-ACNC: <0.2 AI (ref 0–0.9)
ENA SM AB SER-ACNC: <0.2 AI (ref 0–0.9)
ENA SM+RNP AB SER-ACNC: <0.2 AI (ref 0–0.9)
ENA SS-A AB SER-ACNC: <0.2 AI (ref 0–0.9)
ENA SS-B AB SER-ACNC: <0.2 AI (ref 0–0.9)
RIBOSOMAL P AB SER-ACNC: <0.2 AI (ref 0–0.9)
SEE BELOW:, 164879: NORMAL

## 2022-04-19 ENCOUNTER — OFFICE VISIT (OUTPATIENT)
Dept: FAMILY MEDICINE CLINIC | Age: 42
End: 2022-04-19
Payer: COMMERCIAL

## 2022-04-19 VITALS
BODY MASS INDEX: 29.22 KG/M2 | WEIGHT: 181.8 LBS | HEIGHT: 66 IN | RESPIRATION RATE: 18 BRPM | OXYGEN SATURATION: 100 % | SYSTOLIC BLOOD PRESSURE: 136 MMHG | HEART RATE: 82 BPM | DIASTOLIC BLOOD PRESSURE: 94 MMHG | TEMPERATURE: 97.7 F

## 2022-04-19 DIAGNOSIS — I10 ESSENTIAL HYPERTENSION: Primary | ICD-10-CM

## 2022-04-19 PROCEDURE — 99213 OFFICE O/P EST LOW 20 MIN: CPT | Performed by: STUDENT IN AN ORGANIZED HEALTH CARE EDUCATION/TRAINING PROGRAM

## 2022-04-19 NOTE — PROGRESS NOTES
Matthew Reyes presents today for   Chief Complaint   Patient presents with    Follow Up Chronic Condition       Is someone accompanying this pt? noIs the patient using any DME equipment during OV? no    Depression Screening:  3 most recent PHQ Screens 4/19/2022   Little interest or pleasure in doing things Not at all   Feeling down, depressed, irritable, or hopeless Not at all   Total Score PHQ 2 0       Learning Assessment:  Learning Assessment 12/4/2020   PRIMARY LEARNER Patient   PRIMARY LANGUAGE ENGLISH   LEARNER PREFERENCE PRIMARY DEMONSTRATION   ANSWERED BY patient   RELATIONSHIP SELF         Health Maintenance reviewed and discussed and ordered per Provider. Health Maintenance Due   Topic Date Due    Hepatitis C Screening  Never done    DTaP/Tdap/Td series (1 - Tdap) Never done    COVID-19 Vaccine (3 - Booster for Moderna series) 11/20/2021    Lipid Screen  Never done   . Coordination of Care:  1. \"Have you been to the ER, urgent care clinic since your last visit? Hospitalized since your last visit? \" No    2. \"Have you seen or consulted any other health care providers outside of the [address] [place] [address] since your last visit? \" No     3. For patients aged 39-70: Has the patient had a colonoscopy? NA - based on age     If the patient is female:    4. For patients aged 41-77: Has the patient had a mammogram within the past 2 years? NA - based on age    11. For patients aged 21-65: Has the patient had a pap smear?  NA - based on age

## 2022-04-25 NOTE — PROGRESS NOTES
Subjective:   Lesia Cm is a 38 y.o. female who was seen for Follow Up Chronic Condition      Patient here for BP check. States that it is well controlled on losartan. Denies CP or SOB     Home Medications    Medication Sig Start Date End Date Taking? Authorizing Provider   losartan (COZAAR) 100 mg tablet Take 1 Tablet by mouth daily. 3/30/22  Yes Jen BURCIAGA NP   etonogestreL (Nexplanon) 68 mg impl by SubDERmal route. Yes Provider, Historical      No Known Allergies  Social History     Tobacco Use    Smoking status: Never Smoker    Smokeless tobacco: Never Used   Substance Use Topics    Alcohol use: No    Drug use: No        Review of Systems   All other systems reviewed and are negative. Objective:     Visit Vitals  BP (!) 136/94   Pulse 82   Temp 97.7 °F (36.5 °C)   Resp 18   Ht 5' 5.5\" (1.664 m)   Wt 181 lb 12.8 oz (82.5 kg)   SpO2 100%   BMI 29.79 kg/m²        General: alert, oriented, not in distress  Head: scalp normal, atraumatic  Eyes: pupils are equal and reactive, full and intact EOM's  Ears: patent ear canal, intact tympanic membrane  Nose: normal turbinates, no congestion or discharge  Lips/Mouth: moist lips and buccal mucosa, non-enlarged tonsils, pink throat  Neck: supple, no JVD, no lymphadenopathy, non-palpable thyroid  Chest/Lungs: clear breath sounds, no wheezing or crackles  Heart: normal rate, regular rhythm, no murmur  Abdomen: soft, non-distended, non-tender, normal bowel sounds, no organomegaly, no masses  Extremities: no focal deformities, no edema  Skin: no active skin lesions      Assessment & Plan:     1. Essential hypertension  Continue losartan. I have discussed the diagnosis with the patient and the intended plan as seen in the above orders. The patient has received an after-visit summary and questions were answered concerning future plans. I have discussed medication side effects and warnings with the patient as well.  I have reviewed the plan of care with the patient, accepted their input and they are in agreement with the treatment goals. Previous lab and imaging results were reviewed by me.        Lauren Stoll MD  April 24, 2022

## 2022-05-24 DIAGNOSIS — I10 ESSENTIAL HYPERTENSION: ICD-10-CM

## 2022-05-24 NOTE — TELEPHONE ENCOUNTER
----- Message from Christy Castillo sent at 5/24/2022  9:43 AM EDT -----  Subject: Refill Request    QUESTIONS  Name of Medication? losartan (COZAAR) 100 mg tablet  Patient-reported dosage and instructions? once daily  How many days do you have left? 3  Preferred Pharmacy? [address] phone number (if available)? [phone]  ---------------------------------------------------------------------------  --------------  CALL BACK INFO  What is the best way for the office to contact you? OK to leave message on   voicemail  Preferred Call Back Phone Number? [phone]  ---------------------------------------------------------------------------  --------------  SCRIPT ANSWERS  Relationship to Patient?  Self

## 2022-05-24 NOTE — TELEPHONE ENCOUNTER
Last Visit: 4/19/22 with MD Mihai Li  Next Appointment: none  Previous Refill Encounter(s): 3/30/22 #30 with 1 refill    Requested Prescriptions     Pending Prescriptions Disp Refills    losartan (COZAAR) 100 mg tablet 90 Tablet 1     Sig: Take 1 Tablet by mouth daily.

## 2022-05-26 RX ORDER — LOSARTAN POTASSIUM 100 MG/1
100 TABLET ORAL DAILY
Qty: 90 TABLET | Refills: 1 | Status: SHIPPED | OUTPATIENT
Start: 2022-05-26

## 2022-08-10 PROCEDURE — 99284 EMERGENCY DEPT VISIT MOD MDM: CPT

## 2022-08-10 PROCEDURE — 96361 HYDRATE IV INFUSION ADD-ON: CPT

## 2022-08-10 PROCEDURE — 96375 TX/PRO/DX INJ NEW DRUG ADDON: CPT

## 2022-08-10 PROCEDURE — 96374 THER/PROPH/DIAG INJ IV PUSH: CPT

## 2022-08-11 ENCOUNTER — HOSPITAL ENCOUNTER (EMERGENCY)
Age: 42
Discharge: HOME | End: 2022-08-11
Attending: FAMILY MEDICINE
Payer: COMMERCIAL

## 2022-08-11 VITALS
SYSTOLIC BLOOD PRESSURE: 144 MMHG | BODY MASS INDEX: 28.93 KG/M2 | RESPIRATION RATE: 18 BRPM | HEART RATE: 82 BPM | WEIGHT: 180 LBS | TEMPERATURE: 97.1 F | OXYGEN SATURATION: 99 % | DIASTOLIC BLOOD PRESSURE: 85 MMHG | HEIGHT: 66 IN

## 2022-08-11 DIAGNOSIS — M62.838 MUSCLE SPASM: ICD-10-CM

## 2022-08-11 DIAGNOSIS — E87.6 HYPOKALEMIA: ICD-10-CM

## 2022-08-11 DIAGNOSIS — G44.209 TENSION HEADACHE: Primary | ICD-10-CM

## 2022-08-11 LAB
ANION GAP SERPL CALC-SCNC: 9 MMOL/L (ref 3–18)
BASOPHILS # BLD: 0 K/UL (ref 0–0.1)
BASOPHILS NFR BLD: 0 % (ref 0–2)
BUN SERPL-MCNC: 11 MG/DL (ref 7–18)
BUN/CREAT SERPL: 13 (ref 12–20)
CA-I BLD-MCNC: 9.2 MG/DL (ref 8.5–10.1)
CHLORIDE SERPL-SCNC: 105 MMOL/L (ref 100–111)
CO2 SERPL-SCNC: 25 MMOL/L (ref 21–32)
CREAT SERPL-MCNC: 0.88 MG/DL (ref 0.6–1.3)
DIFFERENTIAL METHOD BLD: ABNORMAL
EOSINOPHIL # BLD: 0.2 K/UL (ref 0–0.4)
EOSINOPHIL NFR BLD: 3 % (ref 0–5)
ERYTHROCYTE [DISTWIDTH] IN BLOOD BY AUTOMATED COUNT: 12.1 % (ref 11.6–14.5)
GLUCOSE SERPL-MCNC: 115 MG/DL (ref 74–99)
HCT VFR BLD AUTO: 35.9 % (ref 35–45)
HGB BLD-MCNC: 12.5 G/DL (ref 12–16)
IMM GRANULOCYTES # BLD AUTO: 0 K/UL (ref 0–0.04)
IMM GRANULOCYTES NFR BLD AUTO: 0 % (ref 0–0.5)
LYMPHOCYTES # BLD: 2.7 K/UL (ref 0.9–3.6)
LYMPHOCYTES NFR BLD: 36 % (ref 21–52)
MAGNESIUM SERPL-MCNC: 1.9 MG/DL (ref 1.6–2.6)
MCH RBC QN AUTO: 31 PG (ref 24–34)
MCHC RBC AUTO-ENTMCNC: 34.8 G/DL (ref 31–37)
MCV RBC AUTO: 89.1 FL (ref 78–100)
MONOCYTES # BLD: 0.5 K/UL (ref 0.05–1.2)
MONOCYTES NFR BLD: 6 % (ref 3–10)
NEUTS SEG # BLD: 4 K/UL (ref 1.8–8)
NEUTS SEG NFR BLD: 55 % (ref 40–73)
NRBC # BLD: 0 K/UL (ref 0–0.01)
NRBC BLD-RTO: 0 PER 100 WBC
PLATELET # BLD AUTO: 240 K/UL (ref 135–420)
PMV BLD AUTO: 10.8 FL (ref 9.2–11.8)
POTASSIUM SERPL-SCNC: 3.3 MMOL/L (ref 3.5–5.5)
RBC # BLD AUTO: 4.03 M/UL (ref 4.2–5.3)
SODIUM SERPL-SCNC: 139 MMOL/L (ref 136–145)
WBC # BLD AUTO: 7.3 K/UL (ref 4.6–13.2)

## 2022-08-11 PROCEDURE — 85025 COMPLETE CBC W/AUTO DIFF WBC: CPT

## 2022-08-11 PROCEDURE — 83735 ASSAY OF MAGNESIUM: CPT

## 2022-08-11 PROCEDURE — 36415 COLL VENOUS BLD VENIPUNCTURE: CPT

## 2022-08-11 PROCEDURE — 80048 BASIC METABOLIC PNL TOTAL CA: CPT

## 2022-08-11 PROCEDURE — 74011250637 HC RX REV CODE- 250/637: Performed by: FAMILY MEDICINE

## 2022-08-11 PROCEDURE — 74011250636 HC RX REV CODE- 250/636: Performed by: FAMILY MEDICINE

## 2022-08-11 RX ORDER — CYCLOBENZAPRINE HCL 10 MG
10 TABLET ORAL
Status: COMPLETED | OUTPATIENT
Start: 2022-08-11 | End: 2022-08-11

## 2022-08-11 RX ORDER — ONDANSETRON 4 MG/1
4 TABLET, ORALLY DISINTEGRATING ORAL
Qty: 20 TABLET | Refills: 0 | Status: SHIPPED | OUTPATIENT
Start: 2022-08-11

## 2022-08-11 RX ORDER — KETOROLAC TROMETHAMINE 30 MG/ML
30 INJECTION, SOLUTION INTRAMUSCULAR; INTRAVENOUS ONCE
Status: COMPLETED | OUTPATIENT
Start: 2022-08-11 | End: 2022-08-11

## 2022-08-11 RX ORDER — TIZANIDINE 2 MG/1
2 TABLET ORAL
Qty: 15 TABLET | Refills: 0 | Status: SHIPPED | OUTPATIENT
Start: 2022-08-11

## 2022-08-11 RX ORDER — POTASSIUM CHLORIDE 750 MG/1
40 TABLET, EXTENDED RELEASE ORAL
Status: COMPLETED | OUTPATIENT
Start: 2022-08-11 | End: 2022-08-11

## 2022-08-11 RX ORDER — ONDANSETRON 2 MG/ML
4 INJECTION INTRAMUSCULAR; INTRAVENOUS ONCE
Status: COMPLETED | OUTPATIENT
Start: 2022-08-11 | End: 2022-08-11

## 2022-08-11 RX ADMIN — SODIUM CHLORIDE 1000 ML: 9 INJECTION, SOLUTION INTRAVENOUS at 00:40

## 2022-08-11 RX ADMIN — CYCLOBENZAPRINE HYDROCHLORIDE 10 MG: 10 TABLET, FILM COATED ORAL at 00:28

## 2022-08-11 RX ADMIN — ONDANSETRON 4 MG: 2 INJECTION INTRAMUSCULAR; INTRAVENOUS at 00:41

## 2022-08-11 RX ADMIN — KETOROLAC TROMETHAMINE 30 MG: 30 INJECTION, SOLUTION INTRAMUSCULAR at 00:43

## 2022-08-11 RX ADMIN — POTASSIUM CHLORIDE 40 MEQ: 750 TABLET, EXTENDED RELEASE ORAL at 01:44

## 2022-08-11 NOTE — ED TRIAGE NOTES
Pt ambulatory to room with steady gait, states HA started on left side of head at aprox 1300 yesterday, took goody's then with some relief. Took another goody's at aprox 1900 with some relief, pain worse this morning with muscular bilateral neck pain starting aprox 2 hours ago. Endorses photophobia and nausea, denies vomiting.

## 2022-08-11 NOTE — ED PROVIDER NOTES
EMERGENCY DEPARTMENT HISTORY AND PHYSICAL EXAM      Date: 8/11/2022  Patient Name: Yessenia Valiente    History of Presenting Illness     Chief Complaint   Patient presents with    Headache       History Provided By: Patient    HPI: Yessenia Valiente, 38 y.o. female with a past medical history significant hypertension and headaches, +RAGINI antibody  presents to the ED with cc of headache. Endorses HA started on left side of head at aprox 1300 yesterday, took goody's then with some relief. Took another goody's at aprox 1900 with some relief, pain worse this morning with muscular bilateral neck pain starting aprox 2 hours ago. Endorses photophobia and nausea, denies vomiting. No falls, trauma, fevers. No CP, SOB, numbness, tingling, syncope. Patient has a history of headaches similar to this, this is not the worst headache of her life. No new symptoms or different symptoms with this headache. There are no other complaints, changes, or physical findings at this time. PCP: Narda Cook MD    No current facility-administered medications on file prior to encounter. Current Outpatient Medications on File Prior to Encounter   Medication Sig Dispense Refill    losartan (COZAAR) 100 mg tablet Take 1 Tablet by mouth daily. 90 Tablet 1    etonogestreL (Nexplanon) 68 mg impl by SubDERmal route. Past History     Past Medical History:  Past Medical History:   Diagnosis Date    Hypertension        Past Surgical History:  No past surgical history on file. Family History:  Family History   Problem Relation Age of Onset    Hypertension Mother     Hypertension Father        Social History:  Social History     Tobacco Use    Smoking status: Never    Smokeless tobacco: Never   Substance Use Topics    Alcohol use: Yes     Comment: socially    Drug use: No       Allergies:  No Known Allergies      Review of Systems   CONSTITUTIONAL: Denies weight loss, fever and chills.   HEENT: Denies changes in vision and hearing. RESPIRATORY: Denies SOB and cough. CV: Denies palpitations and CP. GI: Denies abdominal pain, nausea, vomiting and diarrhea. : Denies dysuria and urinary frequency. MSK: +myalgia, spasm. SKIN: Denies rash and pruritus. NEUROLOGICAL: +Headache. PSYCHIATRIC: Denies recent changes in mood. Denies anxiety and depression. Review of Systems    Physical Exam   GENERAL: Afebrile. Alert and oriented x 3. No acute distress. Well-nourished. EYES: EOMI. Anicteric. HENT: Moist mucous membranes. No scleral icterus. No cervical lymphadenopathy. LUNGS: Clear to auscultation bilaterally. No accessory muscle use. CARDIOVASCULAR: Regular rate and rhythm. No murmur. No JVD. ABDOMEN: Soft, non-tender and non-distended. No palpable masses. MSK: Bilateral cervical paraspinal muscle hypertonicity present. Trapezius and sternocleidomastoid tender points present bilaterally. EXTREMITIES: No edema. Non-tender. SKIN: No rashes or lesions. Warm. NEUROLOGIC: No focal neurological deficits. CN II-XII grossly intact, speech and gait are normal.  PSYCHIATRIC: Cooperative. Appropriate mood and affect. Physical Exam    Lab and Diagnostic Study Results     Labs -     Recent Results (from the past 12 hour(s))   CBC WITH AUTOMATED DIFF    Collection Time: 08/11/22 12:43 AM   Result Value Ref Range    WBC 7.3 4.6 - 13.2 K/uL    RBC 4.03 (L) 4.20 - 5.30 M/uL    HGB 12.5 12.0 - 16.0 g/dL    HCT 35.9 35.0 - 45.0 %    MCV 89.1 78.0 - 100.0 FL    MCH 31.0 24.0 - 34.0 PG    MCHC 34.8 31.0 - 37.0 g/dL    RDW 12.1 11.6 - 14.5 %    PLATELET 427 167 - 322 K/uL    MPV 10.8 9.2 - 11.8 FL    NRBC 0.0 0.0  WBC    ABSOLUTE NRBC 0.00 0.00 - 0.01 K/uL    NEUTROPHILS 55 40 - 73 %    LYMPHOCYTES 36 21 - 52 %    MONOCYTES 6 3 - 10 %    EOSINOPHILS 3 0 - 5 %    BASOPHILS 0 0 - 2 %    IMMATURE GRANULOCYTES 0 0 - 0.5 %    ABS. NEUTROPHILS 4.0 1.8 - 8.0 K/UL    ABS. LYMPHOCYTES 2.7 0.9 - 3.6 K/UL    ABS.  MONOCYTES 0.5 0.05 - 1.2 K/UL ABS. EOSINOPHILS 0.2 0.0 - 0.4 K/UL    ABS. BASOPHILS 0.0 0.0 - 0.1 K/UL    ABS. IMM. GRANS. 0.0 0.00 - 0.04 K/UL    DF AUTOMATED     METABOLIC PANEL, BASIC    Collection Time: 08/11/22 12:43 AM   Result Value Ref Range    Sodium 139 136 - 145 mmol/L    Potassium 3.3 (L) 3.5 - 5.5 mmol/L    Chloride 105 100 - 111 mmol/L    CO2 25 21 - 32 mmol/L    Anion gap 9 3.0 - 18.0 mmol/L    Glucose 115 (H) 74 - 99 mg/dL    BUN 11 7 - 18 mg/dL    Creatinine 0.88 0.60 - 1.30 mg/dL    BUN/Creatinine ratio 13 12 - 20      GFR est AA >60 >60 ml/min/1.73m2    GFR est non-AA >60 >60 ml/min/1.73m2    Calcium 9.2 8.5 - 10.1 mg/dL   MAGNESIUM    Collection Time: 08/11/22 12:43 AM   Result Value Ref Range    Magnesium 1.9 1.6 - 2.6 mg/dL       Radiologic Studies -   @lastxrresult@  CT Results  (Last 48 hours)      None          CXR Results  (Last 48 hours)      None              Medical Decision Making   - I am the first provider for this patient. - I reviewed the vital signs, available nursing notes, past medical history, past surgical history, family history and social history. - Initial assessment performed. The patients presenting problems have been discussed, and they are in agreement with the care plan formulated and outlined with them. I have encouraged them to ask questions as they arise throughout their visit. Vital Signs-Reviewed the patient's vital signs.   Patient Vitals for the past 12 hrs:   Temp Pulse Resp BP SpO2   08/11/22 0019 -- 84 18 (!) 143/90 100 %   08/11/22 0009 -- 82 18 (!) 157/112 99 %   08/11/22 0003 97.1 °F (36.2 °C) 84 18 (!) 174/106 100 %       Records Reviewed: Nursing Notes and Old Medical Records    The patient presents with headache with a differential diagnosis of  cluster headache, CVA, meningitis/encephalitis, migraine, sinusitis, tension headache, vascular headache, and tension headache, muscle spasm, electrolyte abnormality, dehydration      ED Course/ Provider Notes (Medical Decision Making):     ED Course as of 08/11/22 0142   Thu Aug 11, 2022   267 72-year-old female endorses HA started on left side of head at aprox 1300 yesterday, took goody's then with some relief. Took another goody's at aprox 1900 with some relief, pain worse this morning with muscular bilateral neck pain starting aprox 2 hours ago. Endorses photophobia and nausea, denies vomiting. [OM]   0132 Magnesium: 1.9 [OM]   0132 Sodium: 139 [OM]   0132 Potassium(!): 3.3  Mild hypokalemia, will replenish. [OM]   0132 All results discussed with patient. At time of discharge, she is feeling improved. No signs of infectious process, neurologic deficit. Follow up outpatient discussed. Stretches for neck demonstrated. [OM]      ED Course User Index  [OM] Glenn Crease, DO           MDM         Procedures   Medical Decision Makingedical Decision Making  Performed by: Tomy Conway DO  PROCEDURES:  Procedures       Disposition   Disposition: Condition improved  DC- Adult Discharges: All of the diagnostic tests were reviewed and questions answered. Diagnosis, care plan and treatment options were discussed. The patient understands the instructions and will follow up as directed. The patients results have been reviewed with them. They have been counseled regarding their diagnosis. The patient verbally convey understanding and agreement of the signs, symptoms, diagnosis, treatment and prognosis and additionally agrees to follow up as recommended with their PCP in 24 - 48 hours. They also agree with the care-plan and convey that all of their questions have been answered. I have also put together some discharge instructions for them that include: 1) educational information regarding their diagnosis, 2) how to care for their diagnosis at home, as well a 3) list of reasons why they would want to return to the ED prior to their follow-up appointment, should their condition change. Discharged    DISCHARGE PLAN:  1.    Current Discharge Medication List        CONTINUE these medications which have NOT CHANGED    Details   losartan (COZAAR) 100 mg tablet Take 1 Tablet by mouth daily. Qty: 90 Tablet, Refills: 1    Associated Diagnoses: Essential hypertension      etonogestreL (Nexplanon) 68 mg impl by SubDERmal route. 2.   Follow-up Information       Follow up With Specialties Details Why Contact Info    Deniz Cho MD Family Medicine Go in 1 week  [address] [phone]            3. Return to ED if worse   4. Current Discharge Medication List        START taking these medications    Details   ondansetron (ZOFRAN ODT) 4 mg disintegrating tablet Take 1 Tablet by mouth every eight (8) hours as needed for Nausea or Vomiting or PRN Reason (Other) (headache). Qty: 20 Tablet, Refills: 0  Start date: 8/11/2022      tiZANidine (ZANAFLEX) 2 mg tablet Take 1 Tablet by mouth three (3) times daily as needed for Muscle Spasm(s) or Pain. Qty: 15 Tablet, Refills: 0  Start date: 8/11/2022               Diagnosis     Clinical Impression:   1. Tension headache    2. Muscle spasm    3. Hypokalemia        Attestations:    Thegold Baltazar, DO    Please note that this dictation was completed with [employer], the computer voice recognition software. Quite often unanticipated grammatical, syntax, homophones, and other interpretive errors are inadvertently transcribed by the computer software. Please disregard these errors. Please excuse any errors that have escaped final proofreading. Thank you.

## 2022-08-11 NOTE — Clinical Note
[facility] EMERGENCY DEPT  [address] [address]  [phone]    Work/School Note    Date: 8/10/2022    To Whom It May concern:    Catia Cardenas was seen and treated today in the emergency room by the following provider(s):  Attending Provider: Candace Ortiz is excused from work/school on 8/11/2022 through 8/13/2022. She is medically clear to return to work/school on 8/14/2022.          Sincerely,          Felice Briones, DO

## 2022-08-11 NOTE — Clinical Note
[facility] EMERGENCY DEPT  [address] [id number] [phone]    Work/School Note    Date: 8/10/2022    To Whom It May concern:    Michelle Magaña was seen and treated today in the emergency room by the following provider(s):  Attending Provider: Inna Baxter is excused from work/school on 8/11/2022 through 8/13/2022. She is medically clear to return to work/school on 8/14/2022.          Sincerely,          Jeri Rudd

## 2022-08-11 NOTE — ED NOTES
Pt denies drowsiness, alert and oriented, steady gait. I have reviewed discharge instructions with the patient. The patient verbalized understanding.

## 2022-12-06 ENCOUNTER — TRANSCRIBE ORDER (OUTPATIENT)
Dept: SCHEDULING | Age: 42
End: 2022-12-06

## 2022-12-06 DIAGNOSIS — Z12.31 SCREENING MAMMOGRAM FOR BREAST CANCER: Primary | ICD-10-CM

## 2023-01-09 ENCOUNTER — OFFICE VISIT (OUTPATIENT)
Dept: FAMILY MEDICINE CLINIC | Age: 43
End: 2023-01-09
Payer: COMMERCIAL

## 2023-01-09 ENCOUNTER — HOSPITAL ENCOUNTER (OUTPATIENT)
Dept: LAB | Age: 43
Discharge: HOME | End: 2023-01-09
Payer: COMMERCIAL

## 2023-01-09 VITALS
OXYGEN SATURATION: 99 % | SYSTOLIC BLOOD PRESSURE: 150 MMHG | BODY MASS INDEX: 29.41 KG/M2 | DIASTOLIC BLOOD PRESSURE: 95 MMHG | RESPIRATION RATE: 18 BRPM | TEMPERATURE: 98 F | HEIGHT: 66 IN | WEIGHT: 183 LBS | HEART RATE: 87 BPM

## 2023-01-09 DIAGNOSIS — R51.9 FREQUENT HEADACHES: ICD-10-CM

## 2023-01-09 DIAGNOSIS — R73.01 IMPAIRED FASTING BLOOD SUGAR: ICD-10-CM

## 2023-01-09 DIAGNOSIS — I10 ESSENTIAL HYPERTENSION: Primary | ICD-10-CM

## 2023-01-09 LAB
ALBUMIN SERPL-MCNC: 3.6 G/DL (ref 3.4–5)
ALBUMIN/GLOB SERPL: 0.9 (ref 0.8–1.7)
ALP SERPL-CCNC: 92 U/L (ref 45–117)
ALT SERPL-CCNC: 23 U/L (ref 13–56)
ANION GAP SERPL CALC-SCNC: 9 MMOL/L (ref 3–18)
AST SERPL W P-5'-P-CCNC: 16 U/L (ref 10–38)
BILIRUB SERPL-MCNC: 0.3 MG/DL (ref 0.2–1)
BUN SERPL-MCNC: 13 MG/DL (ref 7–18)
BUN/CREAT SERPL: 16 (ref 12–20)
CA-I BLD-MCNC: 8.8 MG/DL (ref 8.5–10.1)
CHLORIDE SERPL-SCNC: 103 MMOL/L (ref 100–111)
CHOLEST SERPL-MCNC: 162 MG/DL
CO2 SERPL-SCNC: 27 MMOL/L (ref 21–32)
CREAT SERPL-MCNC: 0.82 MG/DL (ref 0.6–1.3)
GLOBULIN SER CALC-MCNC: 4.1 G/DL (ref 2–4)
GLUCOSE SERPL-MCNC: 93 MG/DL (ref 74–99)
HDLC SERPL-MCNC: 67 MG/DL (ref 40–60)
HDLC SERPL: 2.4 (ref 0–5)
LDLC SERPL CALC-MCNC: 78 MG/DL (ref 0–100)
LIPID PROFILE,FLP: ABNORMAL
POTASSIUM SERPL-SCNC: 4.2 MMOL/L (ref 3.5–5.5)
PROT SERPL-MCNC: 7.7 G/DL (ref 6.4–8.2)
SODIUM SERPL-SCNC: 139 MMOL/L (ref 136–145)
TRIGL SERPL-MCNC: 85 MG/DL (ref ?–150)
VLDLC SERPL CALC-MCNC: 17 MG/DL

## 2023-01-09 PROCEDURE — 80061 LIPID PANEL: CPT

## 2023-01-09 PROCEDURE — 3077F SYST BP >= 140 MM HG: CPT | Performed by: STUDENT IN AN ORGANIZED HEALTH CARE EDUCATION/TRAINING PROGRAM

## 2023-01-09 PROCEDURE — 80053 COMPREHEN METABOLIC PANEL: CPT

## 2023-01-09 PROCEDURE — 99214 OFFICE O/P EST MOD 30 MIN: CPT | Performed by: STUDENT IN AN ORGANIZED HEALTH CARE EDUCATION/TRAINING PROGRAM

## 2023-01-09 PROCEDURE — 36415 COLL VENOUS BLD VENIPUNCTURE: CPT

## 2023-01-09 PROCEDURE — 3080F DIAST BP >= 90 MM HG: CPT | Performed by: STUDENT IN AN ORGANIZED HEALTH CARE EDUCATION/TRAINING PROGRAM

## 2023-01-09 RX ORDER — IBUPROFEN 600 MG/1
600 TABLET ORAL
Qty: 30 TABLET | Refills: 1 | Status: SHIPPED | OUTPATIENT
Start: 2023-01-09

## 2023-01-09 RX ORDER — AMLODIPINE BESYLATE 5 MG/1
5 TABLET ORAL DAILY
Qty: 30 TABLET | Refills: 1 | Status: SHIPPED | OUTPATIENT
Start: 2023-01-09

## 2023-01-09 NOTE — PROGRESS NOTES
Chronic Disease Follow up    Gwynn Officer (: 1980) is a 38 y.o. female here for evaluation of the following chief concerns(s):  New Patient (Here to establish care - former [facility] patient)       ASSESSMENT/PLAN:  1. Essential hypertension  -     METABOLIC PANEL, COMPREHENSIVE  -     LIPID PANEL  -     amLODIPine (NORVASC) 5 mg tablet; Take 1 Tablet by mouth daily. , Normal, Disp-30 Tablet, R-1  2. Frequent headaches  -     ibuprofen (MOTRIN) 600 mg tablet; Take 1 Tablet by mouth every six (6) hours as needed (headaches). , Normal, Disp-30 Tablet, R-1  3. Impaired fasting blood sugar  -     HEMOGLOBIN A1C WITH EAG  Orders Placed This Encounter    METABOLIC PANEL, COMPREHENSIVE    LIPID PANEL    HEMOGLOBIN A1C WITH EAG    amLODIPine (NORVASC) 5 mg tablet     Sig: Take 1 Tablet by mouth daily. Dispense:  30 Tablet     Refill:  1    ibuprofen (MOTRIN) 600 mg tablet     Sig: Take 1 Tablet by mouth every six (6) hours as needed (headaches). Dispense:  30 Tablet     Refill:  1     HTN- above goal. Continue Losartan, add amlodipine. Recheck kidney function and cholesterol   Headaches- elevated BP may be contributing, can use Tylenol/Ibuprofen as needed. If ongoing, consider starting preventative agent. Return in about 1 month (around 2023). Kathryn Wilson agrees with plan as above and has no additional questions at this time. SUBJECTIVE/OBJECTIVE:    Patient is here for follow up blood pressure, This is a new patient to me, was previously following with Dr. Schwartz. Histories have been reviewed and updated per chart below. Patient reports BP's at home have been above goal (>140/90) for most readings. Patient denies, chest pain, SOB, leg swelling, dizziness. Does endorse frequent headaches. Compliant with current medications, tolerating well. Minimal scheduled exercise. Headaches weekly. Lasts for the whole day. Resolves with sleep. One sided. No vision changes, no  nausea/ vomiting. Achy.Sometimes ASA helps- sometimes not    Sees GYN for pap's. UTD. First mammogram is scheduled next month. 2 kids. Nexplanon for contraception. Does not smoke. Drinks socially. Past Medical History:   Diagnosis Date    Frequent headaches     Hypertension      History reviewed. No pertinent surgical history. Family History   Problem Relation Age of Onset    Hypertension Mother     Cancer Father         prostate, lung, bone    Hypertension Father        Social History     Socioeconomic History    Marital status: SINGLE   Tobacco Use    Smoking status: Never    Smokeless tobacco: Never   Substance and Sexual Activity    Alcohol use: Yes     Comment: socially    Drug use: No    Sexual activity: Yes     Partners: Male     Birth control/protection: None     Social Determinants of Health     Financial Resource Strain: Low Risk     Difficulty of Paying Living Expenses: Not hard at all   Food Insecurity: No Food Insecurity    Worried About Running Out of Food in the Last Year: Never true    Ran Out of Food in the Last Year: Never true   Transportation Needs: No Transportation Needs    Lack of Transportation (Medical): No    Lack of Transportation (Non-Medical): No   Housing Stability: Low Risk     Unable to Pay for Housing in the Last Year: No    Number of [place] in the Last Year: 1    Unstable Housing in the Last Year: No     Social History     Tobacco Use   Smoking Status Never   Smokeless Tobacco Never       Current Outpatient Medications   Medication Sig Dispense Refill    amLODIPine (NORVASC) 5 mg tablet Take 1 Tablet by mouth daily. 30 Tablet 1    ibuprofen (MOTRIN) 600 mg tablet Take 1 Tablet by mouth every six (6) hours as needed (headaches). 30 Tablet 1    tiZANidine (ZANAFLEX) 2 mg tablet Take 1 Tablet by mouth three (3) times daily as needed for Muscle Spasm(s) or Pain. 15 Tablet 0    losartan (COZAAR) 100 mg tablet Take 1 Tablet by mouth daily.  90 Tablet 1    etonogestreL (Nexplanon) 68 mg impl by SubDERmal route. No Known Allergies    BP (!) 150/95 (BP 1 Location: Left upper arm, BP Patient Position: Sitting, BP Cuff Size: Large adult)   Pulse 87   Temp 98 °F (36.7 °C) (Temporal)   Resp 18   Ht 5' 5.5\" (1.664 m)   Wt 183 lb (83 kg)   SpO2 99%   BMI 29.99 kg/m²     Gen: NAD, well appearing   Heart: RRR, no m/g/r  Lungs: CTA bilaterally, no wheezing, breathing comfortably  Ext: No swelling  Psych: cooperative. Appropriate mood and affect. Lab Results   Component Value Date/Time    WBC 7.3 08/11/2022 12:43 AM    HGB 12.5 08/11/2022 12:43 AM    HCT 35.9 08/11/2022 12:43 AM    PLATELET 283 03/34/8864 12:43 AM    MCV 89.1 08/11/2022 12:43 AM    Platelet cnt., External 125 07/23/2012 12:00 AM     Lab Results   Component Value Date/Time    Sodium 139 08/11/2022 12:43 AM    Potassium 3.3 (L) 08/11/2022 12:43 AM    Chloride 105 08/11/2022 12:43 AM    CO2 25 08/11/2022 12:43 AM    Anion gap 9 08/11/2022 12:43 AM    Glucose 115 (H) 08/11/2022 12:43 AM    BUN 11 08/11/2022 12:43 AM    Creatinine 0.88 08/11/2022 12:43 AM    BUN/Creatinine ratio 13 08/11/2022 12:43 AM    GFR est AA >60 08/11/2022 12:43 AM    GFR est non-AA >60 08/11/2022 12:43 AM    Calcium 9.2 08/11/2022 12:43 AM    Bilirubin, total 0.2 03/30/2022 10:17 AM    Alk.  phosphatase 69 03/30/2022 10:17 AM    Protein, total 7.6 03/30/2022 10:17 AM    Albumin 4.1 03/30/2022 10:17 AM    Globulin 3.5 03/30/2022 10:17 AM    A-G Ratio 1.2 03/30/2022 10:17 AM    ALT (SGPT) 18 03/30/2022 10:17 AM    AST (SGOT) 20 03/30/2022 10:17 AM     No results found for: CHOL, CHOLPOCT, CHOLX, CHLST, CHOLV, TOTCHOLEXT, HDL, HDLPOC, HDLEXT, HDLP, LDL, LDLCPOC, LDLCEXT, LDLC, DLDLP, VLDLC, VLDL, TGLX, TRIGL, TRIGLYCEXT, TRIGP, TGLPOCT, CHHD, CHHDX    On this date 01/09/23 I have spent 20 minutes reviewing previous notes, test results and face to face with the patient for interview/exam, discussing working diagnosis and treatment plan as well as documenting on the day of the visit. Medical decision making complexity: moderate    I have discussed the diagnosis with the patient and the intended plan as seen in the above orders. The patient has received an after-visit summary and questions were answered concerning future plans. I have discussed medication side effects and warnings with the patient as well. I have reviewed the plan of care with the patient, accepted their input and they are in agreement with the treatment goals. Previous lab and imaging results were reviewed by me.      Jovi Dale MD   Family Medicine

## 2023-01-09 NOTE — PROGRESS NOTES
Michelle Magaña presents today for   Chief Complaint   Patient presents with    New Patient     Here to establish care - [facility]       Is someone accompanying this pt? no    Is the patient using any DME equipment during OV? no    Depression Screening:  3 most recent PHQ Screens 1/9/2023   Little interest or pleasure in doing things More than half the days   Feeling down, depressed, irritable, or hopeless More than half the days   Total Score PHQ 2 4   Trouble falling or staying asleep, or sleeping too much More than half the days   Feeling tired or having little energy More than half the days   Poor appetite, weight loss, or overeating Not at all   Feeling bad about yourself - or that you are a failure or have let yourself or your family down Not at all   Trouble concentrating on things such as school, work, reading, or watching TV Not at all   Moving or speaking so slowly that other people could have noticed; or the opposite being so fidgety that others notice Not at all   Thoughts of being better off dead, or hurting yourself in some way Not at all   PHQ 9 Score 8   How difficult have these problems made it for you to do your work, take care of your home and get along with others Not difficult at all       Learning Assessment:  Learning Assessment 12/4/2020   PRIMARY LEARNER Patient   PRIMARY LANGUAGE ENGLISH   LEARNER PREFERENCE PRIMARY DEMONSTRATION   ANSWERED BY patient   [place] Maintenance reviewed and discussed and ordered per Provider. Health Maintenance Due   Topic Date Due    Hepatitis C Screening  Never done    DTaP/Tdap/Td series (1 - Tdap) Never done    COVID-19 Vaccine (3 - Booster for Moderna series) 08/15/2021    Lipid Screen  Never done    Flu Vaccine (1) Never done   . Coordination of Care:  1. \"Have you been to the ER, urgent care clinic since your last visit? Hospitalized since your last visit? \" No    2.  \"Have you seen or consulted any other health care providers outside of the [address] [place] [address] since your last visit? \" No     3. For patients aged 39-70: Has the patient had a colonoscopy? NA - based on age     If the patient is female:    4. For patients aged 41-77: Has the patient had a mammogram within the past 2 years? NA - based on age    11. For patients aged 21-65: Has the patient had a pap smear?  Yes - no Care Gap present

## 2023-01-31 ENCOUNTER — TRANSCRIBE ORDERS (OUTPATIENT)
Facility: HOSPITAL | Age: 43
End: 2023-01-31

## 2023-01-31 DIAGNOSIS — Z12.31 VISIT FOR SCREENING MAMMOGRAM: Primary | ICD-10-CM

## 2023-03-06 RX ORDER — LOSARTAN POTASSIUM 100 MG/1
TABLET ORAL
Qty: 90 TABLET | Refills: 1 | Status: SHIPPED | OUTPATIENT
Start: 2023-03-06

## 2023-04-07 RX ORDER — AMLODIPINE BESYLATE 5 MG/1
TABLET ORAL
Qty: 90 TABLET | Refills: 1 | Status: SHIPPED | OUTPATIENT
Start: 2023-04-07

## 2023-06-08 ENCOUNTER — HOSPITAL ENCOUNTER (OUTPATIENT)
Facility: HOSPITAL | Age: 43
Discharge: HOME | End: 2023-06-08
Payer: COMMERCIAL

## 2023-06-08 DIAGNOSIS — Z12.31 VISIT FOR SCREENING MAMMOGRAM: ICD-10-CM

## 2023-06-08 PROCEDURE — 77063 BREAST TOMOSYNTHESIS BI: CPT

## 2023-08-30 ENCOUNTER — HOSPITAL ENCOUNTER (OUTPATIENT)
Facility: HOSPITAL | Age: 43
Discharge: HOME | End: 2023-09-02
Payer: COMMERCIAL

## 2023-08-30 DIAGNOSIS — R59.0 ENLARGED LYMPH NODES IN ARMPIT: ICD-10-CM

## 2023-08-30 DIAGNOSIS — R92.2 DENSE BREAST TISSUE ON MAMMOGRAM: ICD-10-CM

## 2023-08-30 PROCEDURE — 76642 ULTRASOUND BREAST LIMITED: CPT

## 2023-09-11 RX ORDER — LOSARTAN POTASSIUM 100 MG/1
TABLET ORAL
Qty: 90 TABLET | Refills: 1 | Status: SHIPPED | OUTPATIENT
Start: 2023-09-11 | End: 2023-09-13

## 2023-09-13 ENCOUNTER — OFFICE VISIT (OUTPATIENT)
Facility: CLINIC | Age: 43
End: 2023-09-13
Payer: COMMERCIAL

## 2023-09-13 VITALS
HEIGHT: 66 IN | OXYGEN SATURATION: 99 % | SYSTOLIC BLOOD PRESSURE: 150 MMHG | DIASTOLIC BLOOD PRESSURE: 92 MMHG | HEART RATE: 85 BPM | BODY MASS INDEX: 29.73 KG/M2 | TEMPERATURE: 97.4 F | RESPIRATION RATE: 18 BRPM | WEIGHT: 185 LBS

## 2023-09-13 DIAGNOSIS — J06.9 VIRAL URI: Primary | ICD-10-CM

## 2023-09-13 DIAGNOSIS — R10.9 ABDOMINAL PAIN, UNSPECIFIED ABDOMINAL LOCATION: ICD-10-CM

## 2023-09-13 DIAGNOSIS — I10 ESSENTIAL HYPERTENSION: ICD-10-CM

## 2023-09-13 PROCEDURE — 3077F SYST BP >= 140 MM HG: CPT | Performed by: STUDENT IN AN ORGANIZED HEALTH CARE EDUCATION/TRAINING PROGRAM

## 2023-09-13 PROCEDURE — G8428 CUR MEDS NOT DOCUMENT: HCPCS | Performed by: STUDENT IN AN ORGANIZED HEALTH CARE EDUCATION/TRAINING PROGRAM

## 2023-09-13 PROCEDURE — 1036F TOBACCO NON-USER: CPT | Performed by: STUDENT IN AN ORGANIZED HEALTH CARE EDUCATION/TRAINING PROGRAM

## 2023-09-13 PROCEDURE — 99214 OFFICE O/P EST MOD 30 MIN: CPT | Performed by: STUDENT IN AN ORGANIZED HEALTH CARE EDUCATION/TRAINING PROGRAM

## 2023-09-13 PROCEDURE — 3080F DIAST BP >= 90 MM HG: CPT | Performed by: STUDENT IN AN ORGANIZED HEALTH CARE EDUCATION/TRAINING PROGRAM

## 2023-09-13 PROCEDURE — G8419 CALC BMI OUT NRM PARAM NOF/U: HCPCS | Performed by: STUDENT IN AN ORGANIZED HEALTH CARE EDUCATION/TRAINING PROGRAM

## 2023-09-13 RX ORDER — LOSARTAN POTASSIUM AND HYDROCHLOROTHIAZIDE 25; 100 MG/1; MG/1
1 TABLET ORAL DAILY
Qty: 90 TABLET | Refills: 1 | Status: SHIPPED | OUTPATIENT
Start: 2023-09-13

## 2023-09-13 NOTE — PROGRESS NOTES
Nessa Suggs presents today for   Chief Complaint   Patient presents with    Back Pain     Lower back and abdominal pain for 4 weeks       Is someone accompanying this pt? no    Is the patient using any DME equipment during OV? no    Health Maintenance reviewed and discussed and ordered per Provider. Health Maintenance Due   Topic Date Due    Hepatitis B vaccine (1 of 3 - 3-dose series) Never done    Varicella vaccine (1 of 2 - 2-dose childhood series) Never done    Hepatitis C screen  Never done    DTaP/Tdap/Td vaccine (1 - Tdap) Never done    Cervical cancer screen  Never done    COVID-19 Vaccine (3 - Moderna series) 08/15/2021    Flu vaccine (1) Never done   . Coordination of Care:  1. \"Have you been to the ER, urgent care clinic since your last visit? Hospitalized since your last visit? \" No    2. \"Have you seen or consulted any other health care providers outside of the [address] [place] [address] since your last visit? \" No

## 2023-12-26 RX ORDER — LOSARTAN POTASSIUM AND HYDROCHLOROTHIAZIDE 25; 100 MG/1; MG/1
1 TABLET ORAL DAILY
Qty: 90 TABLET | Refills: 0 | Status: SHIPPED | OUTPATIENT
Start: 2023-12-26 | End: 2023-12-27 | Stop reason: SDUPTHER

## 2023-12-27 NOTE — TELEPHONE ENCOUNTER
Patient called stating she is unable to get  losartan from [facility] asking it if can be sent to [facility].  Patient stated office has to call her insurance at [phone]

## 2023-12-28 RX ORDER — LOSARTAN POTASSIUM AND HYDROCHLOROTHIAZIDE 25; 100 MG/1; MG/1
1 TABLET ORAL DAILY
Qty: 90 TABLET | Refills: 3 | Status: SHIPPED | OUTPATIENT
Start: 2023-12-28

## 2024-12-26 RX ORDER — LOSARTAN POTASSIUM AND HYDROCHLOROTHIAZIDE 25; 100 MG/1; MG/1
1 TABLET ORAL DAILY
Qty: 90 TABLET | Refills: 3 | OUTPATIENT
Start: 2024-12-26

## 2025-01-23 NOTE — TELEPHONE ENCOUNTER
Patient calls for refill of Losartan HCTZ.  She is scheduled to see Dr. Bolaños on 02/20/2025.  She asks for 90 day supply.  I let her know that we could only send enough to last until her appointment.

## 2025-01-24 RX ORDER — LOSARTAN POTASSIUM AND HYDROCHLOROTHIAZIDE 25; 100 MG/1; MG/1
1 TABLET ORAL DAILY
Qty: 30 TABLET | Refills: 0 | Status: SHIPPED | OUTPATIENT
Start: 2025-01-24

## 2025-03-11 RX ORDER — LOSARTAN POTASSIUM AND HYDROCHLOROTHIAZIDE 25; 100 MG/1; MG/1
1 TABLET ORAL DAILY
Qty: 41 TABLET | Refills: 0 | Status: SHIPPED | OUTPATIENT
Start: 2025-03-11

## 2025-05-05 RX ORDER — LOSARTAN POTASSIUM AND HYDROCHLOROTHIAZIDE 25; 100 MG/1; MG/1
1 TABLET ORAL DAILY
Qty: 15 TABLET | Refills: 0 | Status: SHIPPED | OUTPATIENT
Start: 2025-05-05

## 2025-05-05 NOTE — TELEPHONE ENCOUNTER
Patient called requesting a refill on medication. Patient will need a short supply to last until appointment on 5/20. Would like for prescription to be sent to [facility] in [place], [place].

## 2025-05-12 RX ORDER — LOSARTAN POTASSIUM AND HYDROCHLOROTHIAZIDE 25; 100 MG/1; MG/1
1 TABLET ORAL DAILY
Qty: 15 TABLET | Refills: 0 | Status: SHIPPED | OUTPATIENT
Start: 2025-05-12

## 2025-05-12 NOTE — TELEPHONE ENCOUNTER
Patient called in requesting a refill on medication. She stated that initially her insurance was not going to cover the medication, but now they are. She stated that the pharmacy is needing another prescription sent in for the medication. Would like for prescription to be sent to [facility] in [place], [place].

## 2025-05-20 ENCOUNTER — OFFICE VISIT (OUTPATIENT)
Facility: CLINIC | Age: 45
End: 2025-05-20
Payer: COMMERCIAL

## 2025-05-20 ENCOUNTER — HOSPITAL ENCOUNTER (OUTPATIENT)
Age: 45
Setting detail: SPECIMEN
Discharge: HOME | End: 2025-05-23
Payer: COMMERCIAL

## 2025-05-20 VITALS
HEIGHT: 66 IN | RESPIRATION RATE: 18 BRPM | TEMPERATURE: 97.7 F | DIASTOLIC BLOOD PRESSURE: 97 MMHG | SYSTOLIC BLOOD PRESSURE: 144 MMHG | BODY MASS INDEX: 30.98 KG/M2 | WEIGHT: 192.8 LBS | OXYGEN SATURATION: 100 % | HEART RATE: 77 BPM

## 2025-05-20 DIAGNOSIS — I10 ESSENTIAL HYPERTENSION: ICD-10-CM

## 2025-05-20 DIAGNOSIS — N92.0 MENORRHAGIA WITH REGULAR CYCLE: ICD-10-CM

## 2025-05-20 DIAGNOSIS — Z00.00 ENCOUNTER FOR PHYSICAL EXAMINATION: ICD-10-CM

## 2025-05-20 DIAGNOSIS — E66.811 CLASS 1 OBESITY WITHOUT SERIOUS COMORBIDITY WITH BODY MASS INDEX (BMI) OF 31.0 TO 31.9 IN ADULT, UNSPECIFIED OBESITY TYPE: ICD-10-CM

## 2025-05-20 DIAGNOSIS — Z00.00 ENCOUNTER FOR PHYSICAL EXAMINATION: Primary | ICD-10-CM

## 2025-05-20 LAB
ALBUMIN SERPL-MCNC: 4 G/DL (ref 3.4–5)
ALBUMIN/GLOB SERPL: 1.1
ALP SERPL-CCNC: 90 U/L (ref 45–117)
ALT SERPL-CCNC: 21 U/L (ref 10–35)
ANION GAP SERPL CALC-SCNC: 13 MMOL/L
AST SERPL W P-5'-P-CCNC: 23 U/L (ref 10–38)
BASOPHILS # BLD: 0.02 K/UL (ref 0–0.1)
BASOPHILS NFR BLD: 0.3 % (ref 0–2)
BILIRUB SERPL-MCNC: 0.3 MG/DL (ref 0.2–1)
BUN SERPL-MCNC: 14 MG/DL (ref 6–23)
BUN/CREAT SERPL: 17
CA-I BLD-MCNC: 10 MG/DL (ref 8.5–10.1)
CHLORIDE SERPL-SCNC: 102 MMOL/L (ref 98–107)
CO2 SERPL-SCNC: 22 MMOL/L (ref 21–32)
CREAT SERPL-MCNC: 0.83 MG/DL (ref 0.6–1.3)
DIFFERENTIAL METHOD BLD: NORMAL
EOSINOPHIL # BLD: 0.07 K/UL (ref 0–0.4)
EOSINOPHIL NFR BLD: 1.2 % (ref 0–5)
ERYTHROCYTE [DISTWIDTH] IN BLOOD BY AUTOMATED COUNT: 12.4 % (ref 11.6–14.5)
GLOBULIN SER CALC-MCNC: 3.7 G/DL
GLUCOSE SERPL-MCNC: 92 MG/DL (ref 74–108)
HCT VFR BLD AUTO: 37 % (ref 35–45)
HGB BLD-MCNC: 13 G/DL (ref 12–16)
IMM GRANULOCYTES # BLD AUTO: 0.02 K/UL (ref 0–0.04)
IMM GRANULOCYTES NFR BLD AUTO: 0.3 % (ref 0–0.5)
LYMPHOCYTES # BLD: 1.88 K/UL (ref 0.9–3.6)
LYMPHOCYTES NFR BLD: 30.9 % (ref 21–52)
MCH RBC QN AUTO: 30.8 PG (ref 24–34)
MCHC RBC AUTO-ENTMCNC: 35.1 G/DL (ref 31–37)
MCV RBC AUTO: 87.7 FL (ref 78–100)
MONOCYTES # BLD: 0.36 K/UL (ref 0.05–1.2)
MONOCYTES NFR BLD: 5.9 % (ref 3–10)
NEUTS SEG # BLD: 3.73 K/UL (ref 1.8–8)
NEUTS SEG NFR BLD: 61.4 % (ref 40–73)
NRBC # BLD: 0 K/UL (ref 0–0.01)
NRBC BLD-RTO: 0 PER 100 WBC
PLATELET # BLD AUTO: 278 K/UL (ref 135–420)
PMV BLD AUTO: 10.1 FL (ref 9.2–11.8)
POTASSIUM SERPL-SCNC: 3.3 MMOL/L (ref 3.5–5.5)
PROT SERPL-MCNC: 7.7 G/DL (ref 6.4–8.2)
RBC # BLD AUTO: 4.22 M/UL (ref 4.2–5.3)
SODIUM SERPL-SCNC: 137 MMOL/L (ref 136–145)
WBC # BLD AUTO: 6.1 K/UL (ref 4.6–13.2)

## 2025-05-20 PROCEDURE — 80053 COMPREHEN METABOLIC PANEL: CPT

## 2025-05-20 PROCEDURE — 3080F DIAST BP >= 90 MM HG: CPT | Performed by: STUDENT IN AN ORGANIZED HEALTH CARE EDUCATION/TRAINING PROGRAM

## 2025-05-20 PROCEDURE — 99214 OFFICE O/P EST MOD 30 MIN: CPT | Performed by: STUDENT IN AN ORGANIZED HEALTH CARE EDUCATION/TRAINING PROGRAM

## 2025-05-20 PROCEDURE — G8427 DOCREV CUR MEDS BY ELIG CLIN: HCPCS | Performed by: STUDENT IN AN ORGANIZED HEALTH CARE EDUCATION/TRAINING PROGRAM

## 2025-05-20 PROCEDURE — 3077F SYST BP >= 140 MM HG: CPT | Performed by: STUDENT IN AN ORGANIZED HEALTH CARE EDUCATION/TRAINING PROGRAM

## 2025-05-20 PROCEDURE — 99396 PREV VISIT EST AGE 40-64: CPT | Performed by: STUDENT IN AN ORGANIZED HEALTH CARE EDUCATION/TRAINING PROGRAM

## 2025-05-20 PROCEDURE — 82728 ASSAY OF FERRITIN: CPT

## 2025-05-20 PROCEDURE — 36415 COLL VENOUS BLD VENIPUNCTURE: CPT

## 2025-05-20 PROCEDURE — 85025 COMPLETE CBC W/AUTO DIFF WBC: CPT

## 2025-05-20 PROCEDURE — 80061 LIPID PANEL: CPT

## 2025-05-20 PROCEDURE — 83540 ASSAY OF IRON: CPT

## 2025-05-20 PROCEDURE — 1036F TOBACCO NON-USER: CPT | Performed by: STUDENT IN AN ORGANIZED HEALTH CARE EDUCATION/TRAINING PROGRAM

## 2025-05-20 PROCEDURE — G8417 CALC BMI ABV UP PARAM F/U: HCPCS | Performed by: STUDENT IN AN ORGANIZED HEALTH CARE EDUCATION/TRAINING PROGRAM

## 2025-05-20 PROCEDURE — 83036 HEMOGLOBIN GLYCOSYLATED A1C: CPT

## 2025-05-20 PROCEDURE — 83550 IRON BINDING TEST: CPT

## 2025-05-20 RX ORDER — TIZANIDINE 2 MG/1
2 TABLET ORAL 3 TIMES DAILY PRN
Qty: 30 TABLET | Refills: 0 | Status: SHIPPED | OUTPATIENT
Start: 2025-05-20

## 2025-05-20 RX ORDER — AMLODIPINE BESYLATE 5 MG/1
5 TABLET ORAL DAILY
Qty: 30 TABLET | Refills: 0 | Status: SHIPPED | OUTPATIENT
Start: 2025-05-20

## 2025-05-20 RX ORDER — LOSARTAN POTASSIUM AND HYDROCHLOROTHIAZIDE 25; 100 MG/1; MG/1
1 TABLET ORAL DAILY
Qty: 90 TABLET | Refills: 3 | Status: SHIPPED | OUTPATIENT
Start: 2025-05-20

## 2025-05-20 RX ORDER — AMLODIPINE BESYLATE 5 MG/1
5 TABLET ORAL DAILY
Qty: 90 TABLET | OUTPATIENT
Start: 2025-05-20

## 2025-05-20 SDOH — ECONOMIC STABILITY: FOOD INSECURITY: WITHIN THE PAST 12 MONTHS, YOU WORRIED THAT YOUR FOOD WOULD RUN OUT BEFORE YOU GOT MONEY TO BUY MORE.: NEVER TRUE

## 2025-05-20 SDOH — ECONOMIC STABILITY: FOOD INSECURITY: WITHIN THE PAST 12 MONTHS, THE FOOD YOU BOUGHT JUST DIDN'T LAST AND YOU DIDN'T HAVE MONEY TO GET MORE.: NEVER TRUE

## 2025-05-20 ASSESSMENT — PATIENT HEALTH QUESTIONNAIRE - PHQ9
SUM OF ALL RESPONSES TO PHQ QUESTIONS 1-9: 0
1. LITTLE INTEREST OR PLEASURE IN DOING THINGS: NOT AT ALL
2. FEELING DOWN, DEPRESSED OR HOPELESS: NOT AT ALL
SUM OF ALL RESPONSES TO PHQ QUESTIONS 1-9: 0

## 2025-05-20 ASSESSMENT — ENCOUNTER SYMPTOMS
GASTROINTESTINAL NEGATIVE: 1
RESPIRATORY NEGATIVE: 1

## 2025-05-20 NOTE — PROGRESS NOTES
Well Adult Note  Name: Naomi Cano Today’s Date: 2025   MRN: [id number] Sex: Female   Age: 45 y.o. Ethnicity: Non- / Non    : 1980 Race: Black /       Naomi Cano is here for a well adult exam.       Assessment & Plan   Encounter for physical examination  -     Hemoglobin A1C; Future  -     Lipid Panel; Future  -     Comprehensive Metabolic Panel; Future  -     CBC with Auto Differential; Future  -     Iron and TIBC; Future  -     Ferritin; Future  Essential hypertension  -     Hemoglobin A1C; Future  -     Lipid Panel; Future  -     Comprehensive Metabolic Panel; Future  -     CBC with Auto Differential; Future  -     Iron and TIBC; Future  -     Ferritin; Future  Class 1 obesity without serious comorbidity with body mass index (BMI) of 31.0 to 31.9 in adult, unspecified obesity type  -     Hemoglobin A1C; Future  -     Lipid Panel; Future  -     Comprehensive Metabolic Panel; Future  -     CBC with Auto Differential; Future  -     Iron and TIBC; Future  -     Ferritin; Future  Menorrhagia with regular cycle  -     Iron and TIBC; Future  -     Ferritin; Future    HTN- uncontrolled. Continue Losartan HCTZ. Restart Amlodipine (Has been on in the past and tolerated well). Reviewed DASH diet, regular exercise. Needs updated blood work per above.     Obesity- Diet and exercise counseling. Recommend at least 150 minutes weekly of moderate intensity activity. Limit processed foods, fruit and veggie intake of at least 5 servings per day. Cutting down on sugar sweetened beverages    Menorrhagia- following with GYN, Nexplanon out in the past year. Check iron and ferritin levels      Return for 2 weeks nurse visit BP, 6 months with me.       Subjective   History:    HTN   Complications: none  Compliant with current medications, tolerating well.   Strong family history of HTN    Uses Tizanidine PRN for occasional neck pain     HM/Social  Never smoker  Drinks

## 2025-05-20 NOTE — PROGRESS NOTES
Naomi Cano presents today for   Chief Complaint   Patient presents with    Annual Exam     physical       Is someone accompanying this pt? no    Is the patient using any DME equipment during OV? no    Health Maintenance Due   Topic Date Due    Depression Screen  Never done    Varicella vaccine (1 of 2 - 13+ 2-dose series) Never done    Hepatitis C screen  Never done    Hepatitis B vaccine (1 of 3 - 19+ 3-dose series) Never done    DTaP/Tdap/Td vaccine (1 - Tdap) Never done    COVID-19 Vaccine (3 - 2024-25 season) 09/01/2024         \"Have you been to the ER, urgent care clinic since your last visit?  Hospitalized since your last visit?\"    NO    “Have you seen or consulted any other health care providers outside our system since your last visit?”    NO

## 2025-05-21 ENCOUNTER — RESULTS FOLLOW-UP (OUTPATIENT)
Facility: CLINIC | Age: 45
End: 2025-05-21

## 2025-05-21 DIAGNOSIS — E87.6 HYPOKALEMIA: Primary | ICD-10-CM

## 2025-05-21 LAB
CHOLEST SERPL-MCNC: 183 MG/DL
EST. AVERAGE GLUCOSE BLD GHB EST-MCNC: 110 MG/DL
FERRITIN SERPL-MCNC: 63 NG/ML (ref 13–400)
HBA1C MFR BLD: 5.5 % (ref 4.2–5.6)
HDLC SERPL-MCNC: 61 MG/DL (ref 40–60)
HDLC SERPL: 3 (ref 0–5)
IRON SATN MFR SERPL: 19 %
IRON SERPL-MCNC: 65 UG/DL (ref 50–175)
LDLC SERPL CALC-MCNC: 105 MG/DL (ref 0–100)
TIBC SERPL-MCNC: 337 UG/DL (ref 250–450)
TRIGL SERPL-MCNC: 84 MG/DL (ref 0–150)
UIBC SERPL-MCNC: 272 UG/DL (ref 112–347)
VLDLC SERPL CALC-MCNC: 17 MG/DL

## 2025-06-03 RX ORDER — TIZANIDINE 2 MG/1
2 TABLET ORAL 3 TIMES DAILY PRN
Qty: 30 TABLET | Refills: 0 | Status: SHIPPED | OUTPATIENT
Start: 2025-06-03

## 2025-06-19 ENCOUNTER — TELEPHONE (OUTPATIENT)
Facility: CLINIC | Age: 45
End: 2025-06-19

## 2025-06-23 NOTE — TELEPHONE ENCOUNTER
Holly called for refill. Stated they had sent over several times but I don't see we received it. She is completely out

## 2025-07-28 RX ORDER — AMLODIPINE BESYLATE 5 MG/1
5 TABLET ORAL DAILY
Qty: 30 TABLET | Refills: 0 | OUTPATIENT
Start: 2025-07-28